# Patient Record
Sex: FEMALE | Race: WHITE | Employment: FULL TIME | URBAN - METROPOLITAN AREA
[De-identification: names, ages, dates, MRNs, and addresses within clinical notes are randomized per-mention and may not be internally consistent; named-entity substitution may affect disease eponyms.]

---

## 2018-07-05 ENCOUNTER — HOSPITAL ENCOUNTER (OUTPATIENT)
Dept: PREADMISSION TESTING | Age: 43
Discharge: HOME | End: 2018-07-09
Payer: COMMERCIAL

## 2018-07-05 VITALS
HEIGHT: 67 IN | DIASTOLIC BLOOD PRESSURE: 73 MMHG | TEMPERATURE: 98.2 F | HEART RATE: 75 BPM | SYSTOLIC BLOOD PRESSURE: 106 MMHG | WEIGHT: 276.4 LBS | RESPIRATION RATE: 16 BRPM | OXYGEN SATURATION: 96 % | BODY MASS INDEX: 43.38 KG/M2

## 2018-07-05 DIAGNOSIS — M16.0 ARTHRITIS OF BOTH HIPS: Chronic | ICD-10-CM

## 2018-07-05 DIAGNOSIS — M72.2 PLANTAR FASCIITIS OF LEFT FOOT: ICD-10-CM

## 2018-07-05 PROBLEM — M06.9 RA (RHEUMATOID ARTHRITIS) (HCC): Chronic | Status: ACTIVE | Noted: 2018-07-05

## 2018-07-05 LAB
HCT VFR BLD CALC: 37.2 % (ref 37–47)
HEMOGLOBIN: 13 G/DL (ref 12–16)
MCH RBC QN AUTO: 32.2 PG (ref 27–31.3)
MCHC RBC AUTO-ENTMCNC: 35 % (ref 33–37)
MCV RBC AUTO: 91.9 FL (ref 82–100)
PDW BLD-RTO: 13.6 % (ref 11.5–14.5)
PLATELET # BLD: 431 K/UL (ref 130–400)
RBC # BLD: 4.05 M/UL (ref 4.2–5.4)
WBC # BLD: 8.7 K/UL (ref 4.8–10.8)

## 2018-07-05 PROCEDURE — 85027 COMPLETE CBC AUTOMATED: CPT

## 2018-07-05 RX ORDER — LANOLIN ALCOHOL/MO/W.PET/CERES
1 CREAM (GRAM) TOPICAL 2 TIMES DAILY
COMMUNITY

## 2018-07-05 RX ORDER — IBUPROFEN 200 MG
200 TABLET ORAL EVERY 6 HOURS PRN
Status: ON HOLD | COMMUNITY
End: 2018-07-06 | Stop reason: HOSPADM

## 2018-07-05 RX ORDER — PANTOPRAZOLE SODIUM 20 MG/1
20 TABLET, DELAYED RELEASE ORAL DAILY
COMMUNITY

## 2018-07-05 ASSESSMENT — ENCOUNTER SYMPTOMS
NAUSEA: 0
SHORTNESS OF BREATH: 0
EYES NEGATIVE: 1
HEARTBURN: 0
DOUBLE VISION: 0
VOMITING: 0
SORE THROAT: 0
WHEEZING: 0
DIARRHEA: 0
BLURRED VISION: 0
ABDOMINAL PAIN: 0
STRIDOR: 0
CONSTIPATION: 0
COUGH: 0
BACK PAIN: 1

## 2018-07-05 NOTE — H&P
Nurse Practitioner History and Physical      CHIEF COMPLAINT:  Left foot pain    HISTORY OF PRESENT ILLNESS:      The patient is a 43 y.o. female with significant past medical history of plantar fasciitis left foot who presents plantar fasciotomy left foot via radiofrequency coblation. States sx of both feet but left worse than right. Has had multiple cortisone injections of both feet, exceeding limit on left foot therefore OR is scheduled. Sx > 4 yrs duration. States unable to walk, weight bear & feet swell. 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 Past Medical History:        Diagnosis Date    Arthritis     knees, hands    Asthma     JESSI on CPAP     PVC (premature ventricular contraction) 2017    cardiac ablation     Past Surgical History:    Past Surgical History:   Procedure Laterality Date    ABLATION OF DYSRHYTHMIC FOCUS  07/06/2017    at [address] Bilateral 2001    COLONOSCOPY      ENDOSCOPY, COLON, DIAGNOSTIC      TUBAL LIGATION  2002         Medications Prior to Admission:    Current Outpatient Prescriptions   Medication Sig Dispense Refill    CPAP Machine MISC by Nasal route       No current facility-administered medications for this encounter. Allergies:  Tetracycline    Social History:   Social History     Social History    Marital status:      Spouse name: N/A    Number of children: N/A    Years of education: N/A     Occupational History    Not on file.      Social History Main Topics    Smoking status: Former Smoker     Packs/day: 0.50     Years: 12.00     Types: Cigarettes     Quit date: 7/5/2014    Smokeless tobacco: Never Used    Alcohol use Yes      Comment: social    Drug use: No    Sexual activity: Not on file     Other Topics Concern    Not on file     Social History Narrative    No narrative on file       Family History:   Family History   Problem Relation Age of Onset    No Known Problems Sister     No Known

## 2018-07-06 ENCOUNTER — ANESTHESIA EVENT (OUTPATIENT)
Dept: OPERATING ROOM | Age: 43
End: 2018-07-06
Payer: COMMERCIAL

## 2018-07-06 ENCOUNTER — ANESTHESIA (OUTPATIENT)
Dept: OPERATING ROOM | Age: 43
End: 2018-07-06
Payer: COMMERCIAL

## 2018-07-06 ENCOUNTER — HOSPITAL ENCOUNTER (OUTPATIENT)
Age: 43
Setting detail: OUTPATIENT SURGERY
Discharge: HOME | End: 2018-07-06
Attending: PODIATRIST | Admitting: PODIATRIST
Payer: COMMERCIAL

## 2018-07-06 VITALS
HEART RATE: 64 BPM | OXYGEN SATURATION: 98 % | DIASTOLIC BLOOD PRESSURE: 89 MMHG | WEIGHT: 276 LBS | SYSTOLIC BLOOD PRESSURE: 143 MMHG | HEIGHT: 67 IN | BODY MASS INDEX: 43.32 KG/M2 | RESPIRATION RATE: 18 BRPM | TEMPERATURE: 97.8 F

## 2018-07-06 VITALS
SYSTOLIC BLOOD PRESSURE: 123 MMHG | DIASTOLIC BLOOD PRESSURE: 59 MMHG | RESPIRATION RATE: 25 BRPM | OXYGEN SATURATION: 100 %

## 2018-07-06 DIAGNOSIS — M72.2 PLANTAR FASCIITIS OF LEFT FOOT: Primary | ICD-10-CM

## 2018-07-06 LAB — HCG(URINE) PREGNANCY TEST: NEGATIVE

## 2018-07-06 PROCEDURE — 2580000003 HC RX 258: Performed by: NURSE PRACTITIONER

## 2018-07-06 PROCEDURE — 7100000010 HC PHASE II RECOVERY - FIRST 15 MIN: Performed by: PODIATRIST

## 2018-07-06 PROCEDURE — 3700000001 HC ADD 15 MINUTES (ANESTHESIA): Performed by: PODIATRIST

## 2018-07-06 PROCEDURE — 7100000011 HC PHASE II RECOVERY - ADDTL 15 MIN: Performed by: PODIATRIST

## 2018-07-06 PROCEDURE — 3700000000 HC ANESTHESIA ATTENDED CARE: Performed by: PODIATRIST

## 2018-07-06 PROCEDURE — 6360000002 HC RX W HCPCS: Performed by: ANESTHESIOLOGY

## 2018-07-06 PROCEDURE — 6370000000 HC RX 637 (ALT 250 FOR IP): Performed by: ANESTHESIOLOGY

## 2018-07-06 PROCEDURE — 84703 CHORIONIC GONADOTROPIN ASSAY: CPT

## 2018-07-06 PROCEDURE — 2500000003 HC RX 250 WO HCPCS: Performed by: PODIATRIST

## 2018-07-06 PROCEDURE — 3600000013 HC SURGERY LEVEL 3 ADDTL 15MIN: Performed by: PODIATRIST

## 2018-07-06 PROCEDURE — 2500000003 HC RX 250 WO HCPCS: Performed by: ANESTHESIOLOGY

## 2018-07-06 PROCEDURE — 3600000003 HC SURGERY LEVEL 3 BASE: Performed by: PODIATRIST

## 2018-07-06 PROCEDURE — 2580000003 HC RX 258: Performed by: PODIATRIST

## 2018-07-06 RX ORDER — DIPHENHYDRAMINE HYDROCHLORIDE 50 MG/ML
12.5 INJECTION INTRAMUSCULAR; INTRAVENOUS
Status: DISCONTINUED | OUTPATIENT
Start: 2018-07-06 | End: 2018-07-06 | Stop reason: HOSPADM

## 2018-07-06 RX ORDER — MAGNESIUM HYDROXIDE 1200 MG/15ML
LIQUID ORAL CONTINUOUS PRN
Status: DISCONTINUED | OUTPATIENT
Start: 2018-07-06 | End: 2018-07-06 | Stop reason: HOSPADM

## 2018-07-06 RX ORDER — SODIUM CHLORIDE 0.9 % (FLUSH) 0.9 %
10 SYRINGE (ML) INJECTION PRN
Status: DISCONTINUED | OUTPATIENT
Start: 2018-07-06 | End: 2018-07-06 | Stop reason: HOSPADM

## 2018-07-06 RX ORDER — FENTANYL CITRATE 50 UG/ML
50 INJECTION, SOLUTION INTRAMUSCULAR; INTRAVENOUS EVERY 10 MIN PRN
Status: DISCONTINUED | OUTPATIENT
Start: 2018-07-06 | End: 2018-07-06 | Stop reason: HOSPADM

## 2018-07-06 RX ORDER — METOCLOPRAMIDE HYDROCHLORIDE 5 MG/ML
10 INJECTION INTRAMUSCULAR; INTRAVENOUS
Status: DISCONTINUED | OUTPATIENT
Start: 2018-07-06 | End: 2018-07-06 | Stop reason: HOSPADM

## 2018-07-06 RX ORDER — PROPOFOL 10 MG/ML
INJECTION, EMULSION INTRAVENOUS PRN
Status: DISCONTINUED | OUTPATIENT
Start: 2018-07-06 | End: 2018-07-06 | Stop reason: SDUPTHER

## 2018-07-06 RX ORDER — OXYCODONE HYDROCHLORIDE AND ACETAMINOPHEN 5; 325 MG/1; MG/1
1 TABLET ORAL EVERY 4 HOURS PRN
Qty: 42 TABLET | Refills: 0 | Status: SHIPPED | OUTPATIENT
Start: 2018-07-06 | End: 2018-07-13

## 2018-07-06 RX ORDER — SODIUM CHLORIDE 0.9 % (FLUSH) 0.9 %
10 SYRINGE (ML) INJECTION EVERY 12 HOURS SCHEDULED
Status: DISCONTINUED | OUTPATIENT
Start: 2018-07-06 | End: 2018-07-06 | Stop reason: HOSPADM

## 2018-07-06 RX ORDER — LIDOCAINE HYDROCHLORIDE 10 MG/ML
INJECTION, SOLUTION EPIDURAL; INFILTRATION; INTRACAUDAL; PERINEURAL PRN
Status: DISCONTINUED | OUTPATIENT
Start: 2018-07-06 | End: 2018-07-06 | Stop reason: SDUPTHER

## 2018-07-06 RX ORDER — LIDOCAINE HYDROCHLORIDE 10 MG/ML
1 INJECTION, SOLUTION EPIDURAL; INFILTRATION; INTRACAUDAL; PERINEURAL
Status: DISCONTINUED | OUTPATIENT
Start: 2018-07-06 | End: 2018-07-06 | Stop reason: HOSPADM

## 2018-07-06 RX ORDER — PROPOFOL 10 MG/ML
INJECTION, EMULSION INTRAVENOUS CONTINUOUS PRN
Status: DISCONTINUED | OUTPATIENT
Start: 2018-07-06 | End: 2018-07-06 | Stop reason: SDUPTHER

## 2018-07-06 RX ORDER — BUPIVACAINE HYDROCHLORIDE AND EPINEPHRINE 5; 5 MG/ML; UG/ML
INJECTION, SOLUTION EPIDURAL; INTRACAUDAL; PERINEURAL PRN
Status: DISCONTINUED | OUTPATIENT
Start: 2018-07-06 | End: 2018-07-06 | Stop reason: HOSPADM

## 2018-07-06 RX ORDER — SODIUM CHLORIDE, SODIUM LACTATE, POTASSIUM CHLORIDE, CALCIUM CHLORIDE 600; 310; 30; 20 MG/100ML; MG/100ML; MG/100ML; MG/100ML
INJECTION, SOLUTION INTRAVENOUS CONTINUOUS
Status: DISCONTINUED | OUTPATIENT
Start: 2018-07-06 | End: 2018-07-06 | Stop reason: HOSPADM

## 2018-07-06 RX ORDER — ONDANSETRON 2 MG/ML
4 INJECTION INTRAMUSCULAR; INTRAVENOUS
Status: COMPLETED | OUTPATIENT
Start: 2018-07-06 | End: 2018-07-06

## 2018-07-06 RX ORDER — HYDROCODONE BITARTRATE AND ACETAMINOPHEN 5; 325 MG/1; MG/1
2 TABLET ORAL PRN
Status: COMPLETED | OUTPATIENT
Start: 2018-07-06 | End: 2018-07-06

## 2018-07-06 RX ORDER — HYDROCODONE BITARTRATE AND ACETAMINOPHEN 5; 325 MG/1; MG/1
1 TABLET ORAL PRN
Status: COMPLETED | OUTPATIENT
Start: 2018-07-06 | End: 2018-07-06

## 2018-07-06 RX ORDER — MEPERIDINE HYDROCHLORIDE 50 MG/ML
12.5 INJECTION INTRAMUSCULAR; INTRAVENOUS; SUBCUTANEOUS EVERY 5 MIN PRN
Status: DISCONTINUED | OUTPATIENT
Start: 2018-07-06 | End: 2018-07-06 | Stop reason: HOSPADM

## 2018-07-06 RX ADMIN — ONDANSETRON 4 MG: 2 INJECTION INTRAMUSCULAR; INTRAVENOUS at 09:42

## 2018-07-06 RX ADMIN — LIDOCAINE HYDROCHLORIDE 50 MG: 10 INJECTION, SOLUTION EPIDURAL; INFILTRATION; INTRACAUDAL; PERINEURAL at 09:00

## 2018-07-06 RX ADMIN — PROPOFOL 100 MCG/KG/MIN: 10 INJECTION, EMULSION INTRAVENOUS at 09:00

## 2018-07-06 RX ADMIN — SODIUM CHLORIDE, POTASSIUM CHLORIDE, SODIUM LACTATE AND CALCIUM CHLORIDE: 600; 310; 30; 20 INJECTION, SOLUTION INTRAVENOUS at 08:03

## 2018-07-06 RX ADMIN — PROPOFOL 100 MG: 10 INJECTION, EMULSION INTRAVENOUS at 09:00

## 2018-07-06 RX ADMIN — HYDROCODONE BITARTRATE AND ACETAMINOPHEN 2 TABLET: 5; 325 TABLET ORAL at 09:58

## 2018-07-06 ASSESSMENT — PULMONARY FUNCTION TESTS
PIF_VALUE: 1
PIF_VALUE: 2
PIF_VALUE: 1
PIF_VALUE: 2
PIF_VALUE: 1
PIF_VALUE: 2
PIF_VALUE: 1
PIF_VALUE: 1
PIF_VALUE: 0
PIF_VALUE: 1

## 2018-07-06 ASSESSMENT — PAIN - FUNCTIONAL ASSESSMENT: PAIN_FUNCTIONAL_ASSESSMENT: 0-10

## 2018-07-06 ASSESSMENT — PAIN SCALES - GENERAL
PAINLEVEL_OUTOF10: 0
PAINLEVEL_OUTOF10: 5
PAINLEVEL_OUTOF10: 0

## 2018-07-06 ASSESSMENT — PAIN DESCRIPTION - DESCRIPTORS: DESCRIPTORS: ACHING

## 2018-07-06 ASSESSMENT — PAIN DESCRIPTION - LOCATION: LOCATION: FOOT

## 2018-07-06 ASSESSMENT — PAIN DESCRIPTION - PAIN TYPE: TYPE: SURGICAL PAIN

## 2018-07-06 ASSESSMENT — PAIN DESCRIPTION - ORIENTATION: ORIENTATION: LEFT

## 2018-07-06 NOTE — ANESTHESIA PRE PROCEDURE
Department of Anesthesiology  Preprocedure Note       Name:  Luc Lucas   Age:  43 y.o.  :  1975                                          MRN:  [id number]         Date:  2018      Surgeon: Donna Harvey):  Tremaine Box DPM    Procedure: Procedure(s):  PLANTAR FASCIOTOMY LEFT VIA RADIOFREQUENCY COBLATION    Medications prior to admission:   Prior to Admission medications    Medication Sig Start Date End Date Taking? Authorizing Provider   CPAP Machine MISC by Nasal route    Historical Provider, MD   ibuprofen (ADVIL;MOTRIN) 200 MG tablet Take 200 mg by mouth every 6 hours as needed for Pain    Historical Provider, MD   adalimumab (HUMIRA) 40 MG/0.8ML injection Inject 40 mg into the skin once    Historical Provider, MD   calcium citrate-vitamin D (CALCIUM + D) 315-200 MG-UNIT per tablet Take 1 tablet by mouth 2 times daily    Historical Provider, MD   pantoprazole (PROTONIX) 20 MG tablet Take 20 mg by mouth daily    Historical Provider, MD       Current medications:    No current facility-administered medications for this encounter. Current Outpatient Prescriptions   Medication Sig Dispense Refill    CPAP Machine MISC by Nasal route      ibuprofen (ADVIL;MOTRIN) 200 MG tablet Take 200 mg by mouth every 6 hours as needed for Pain      adalimumab (HUMIRA) 40 MG/0.8ML injection Inject 40 mg into the skin once      calcium citrate-vitamin D (CALCIUM + D) 315-200 MG-UNIT per tablet Take 1 tablet by mouth 2 times daily      pantoprazole (PROTONIX) 20 MG tablet Take 20 mg by mouth daily         Allergies:     Allergies   Allergen Reactions    Tetracycline Anaphylaxis       Problem List:    Patient Active Problem List   Diagnosis Code    RA (rheumatoid arthritis) ([facility] [address] M06.9    Arthritis of both hips M16.0    Plantar fasciitis of left foot M72.2       Past Medical History:        Diagnosis Date    Arthritis     knees, hands    Asthma     JESSI on CPAP     PVC (premature ventricular contraction)

## 2018-07-06 NOTE — BRIEF OP NOTE
Brief Postoperative Note  ______________________________________________________________    Patient: Era Diamond  YOB: 1975  MRN: [id number]  Date of Procedure: 7/6/2018    Pre-Op Diagnosis: PLANTAR FASCIITIS CHRONIC LEFT    Post-Op Diagnosis: Same       Procedure(s):  PLANTAR FASCIOTOMY LEFT VIA RADIOFREQUENCY COBLATION    Anesthesia: Monitor Anesthesia Care    Surgeon(s):  PEPE Turcios DPM- assistant    Staff:  Scrub Person First: Bryan Reeves     Estimated Blood Loss: * No values recorded between 7/6/2018  8:55 AM and 7/6/2018  9:23 AM * None    Complications: None    Specimens:   * No specimens in log *    Implants:  * No implants in log *      Drains:      Findings: consistent with diagnosis     Nelson Sanchez DPM  Date: 7/6/2018  Time: 9:23 AM

## 2018-07-06 NOTE — PROGRESS NOTES
PRE-OPERATIVE NOTE:   [id number]    S: This is a 43 y.o. female who presents with plantar fasciitis of her left foot that has been refractory to conservative therapy. Patient would like to pursue surgical intervention for this issue. Patient has no new complaints today. No current facility-administered medications for this encounter. No current outpatient prescriptions on file. Facility-Administered Medications Ordered in Other Encounters   Medication Dose Route Frequency Provider Last Rate Last Dose    lactated ringers infusion   Intravenous Continuous [place] Siskin, APRN -  mL/hr at 07/06/18 0803      sodium chloride flush 0.9 % injection 10 mL  10 mL Intravenous 2 times per day [place] Siskin, APRN - CNP        sodium chloride flush 0.9 % injection 10 mL  10 mL Intravenous PRN [place] Siskin, APRN - CNP        lidocaine PF 1 % injection 1 mL  1 mL Intradermal Once PRN [place] Siskin, APRN - CNP           Allergies: Allergies   Allergen Reactions    Tetracycline Anaphylaxis       Social Hx:  Social History   Substance Use Topics    Smoking status: Former Smoker     Packs/day: 0.50     Years: 12.00     Types: Cigarettes     Quit date: 7/5/2014    Smokeless tobacco: Never Used    Alcohol use Yes      Comment: social     Labs:  Lab Results   Component Value Date    WBC 8.7 07/05/2018    HGB 13.0 07/05/2018    HCT 37.2 07/05/2018    MCV 91.9 07/05/2018     (H) 07/05/2018       No results found for: NA, K, CL, CO2, BUN, CREATININE, GLUCOSE, CALCIUM     ECG: n/a    HCG:   HCG(Urine) Pregnancy Test Negative  Detects HCG level >20 MIU/mL Final 07/06/2018  7:46 AM [facility]         PE: NVS intact to left foot. No SOI left foot or LE. No breaks in skin. A: Left foot plantar fasciitis    P: Plan is for left foot topaz procedure in OR today. The correct extremity was identified, confirmed with patient and labeled. Pt has been NPO since midnight.  Pt has been cleared for surgery. The current H&P and respective labs have been reviewed and signed. Again, a long discussion was performed which included all risks, benefits, nature and alternatives to the surgery scheduled. Ms. Brittnee Rodriguez again appears to understand and wishes to proceed. All questions were answered to patient's and family's satisfaction with no guarantees given. Consent was signed, is up to date and is in chart.

## 2018-07-06 NOTE — ANESTHESIA POSTPROCEDURE EVALUATION
Department of Anesthesiology  Postprocedure Note    Patient: Gilson Anthony  MRN: [id number]  YOB: 1975  Date of evaluation: 7/6/2018  Time:  9:32 AM     Procedure Summary     Date:  07/06/18 Room / Location:  [address]    Anesthesia Start:  0856 Anesthesia Stop:  0930    Procedure:  PLANTAR FASCIOTOMY LEFT VIA RADIOFREQUENCY COBLATION (Left ) Diagnosis:  (PLANTAR FASCIITIS CHRONIC LEFT)    Surgeon:  Fatuma Malone DPM Responsible Provider:  Thaddeus White MD    Anesthesia Type:  MAC ASA Status:  3          Anesthesia Type: MAC    Ernie Phase I: Ernie Score: 10    Ernie Phase II:      Last vitals: Reviewed and per EMR flowsheets.        Anesthesia Post Evaluation    Patient location during evaluation: PACU  Patient participation: complete - patient participated  Level of consciousness: awake and alert  Pain score: 0  Airway patency: patent  Nausea & Vomiting: no nausea and no vomiting  Complications: no  Cardiovascular status: blood pressure returned to baseline and hemodynamically stable  Respiratory status: acceptable  Hydration status: euvolemic

## 2023-10-20 PROBLEM — R35.0 URINARY FREQUENCY: Status: ACTIVE | Noted: 2023-10-20

## 2023-10-20 PROBLEM — E66.812 CLASS 2 SEVERE OBESITY WITH BODY MASS INDEX (BMI) OF 35 TO 39.9 WITH SERIOUS COMORBIDITY: Status: ACTIVE | Noted: 2023-10-20

## 2023-10-20 PROBLEM — E78.5 HYPERLIPIDEMIA: Status: ACTIVE | Noted: 2023-10-20

## 2023-10-20 PROBLEM — M77.11 LATERAL EPICONDYLITIS OF RIGHT ELBOW: Status: ACTIVE | Noted: 2023-10-20

## 2023-10-20 PROBLEM — R06.83 SNORING: Status: ACTIVE | Noted: 2023-10-20

## 2023-10-20 PROBLEM — R09.89 CHOKING EPISODE: Status: ACTIVE | Noted: 2023-10-20

## 2023-10-20 PROBLEM — N93.9 ABNORMAL UTERINE BLEEDING (AUB): Status: ACTIVE | Noted: 2023-10-20

## 2023-10-20 PROBLEM — M06.9 RHEUMATOID ARTHRITIS (MULTI): Status: ACTIVE | Noted: 2023-10-20

## 2023-10-20 PROBLEM — I10 BENIGN ESSENTIAL HYPERTENSION: Status: ACTIVE | Noted: 2023-10-20

## 2023-10-20 PROBLEM — R00.2 PALPITATIONS: Status: ACTIVE | Noted: 2023-10-20

## 2023-10-20 PROBLEM — N95.1 PERIMENOPAUSE: Status: ACTIVE | Noted: 2023-10-20

## 2023-10-20 PROBLEM — Z98.890 H/O CARDIAC RADIOFREQUENCY ABLATION: Status: ACTIVE | Noted: 2023-10-20

## 2023-10-20 PROBLEM — E55.9 VITAMIN D INSUFFICIENCY: Status: ACTIVE | Noted: 2023-10-20

## 2023-10-20 PROBLEM — K21.9 GERD (GASTROESOPHAGEAL REFLUX DISEASE): Status: ACTIVE | Noted: 2023-10-20

## 2023-10-20 PROBLEM — E53.8 B12 DEFICIENCY: Status: ACTIVE | Noted: 2023-10-20

## 2023-10-20 PROBLEM — R07.81 CHEST PAIN, PLEURITIC: Status: ACTIVE | Noted: 2023-10-20

## 2023-10-20 PROBLEM — L65.9 HAIR LOSS: Status: ACTIVE | Noted: 2023-10-20

## 2023-10-20 PROBLEM — M62.838 CERVICAL PARASPINAL MUSCLE SPASM: Status: ACTIVE | Noted: 2023-10-20

## 2023-10-20 PROBLEM — K59.09 CONSTIPATION, CHRONIC: Status: ACTIVE | Noted: 2023-10-20

## 2023-10-20 PROBLEM — K44.9 HIATAL HERNIA: Status: ACTIVE | Noted: 2023-10-20

## 2023-10-20 PROBLEM — G47.33 OBSTRUCTIVE SLEEP APNEA: Status: ACTIVE | Noted: 2023-10-20

## 2023-10-20 PROBLEM — D64.9 ANEMIA: Status: ACTIVE | Noted: 2023-10-20

## 2023-10-20 PROBLEM — E66.01 CLASS 2 SEVERE OBESITY WITH BODY MASS INDEX (BMI) OF 35 TO 39.9 WITH SERIOUS COMORBIDITY (MULTI): Status: ACTIVE | Noted: 2023-10-20

## 2023-10-20 PROBLEM — K63.5 COLON POLYPS: Status: ACTIVE | Noted: 2023-10-20

## 2023-10-20 PROBLEM — G47.8 NON-RESTORATIVE SLEEP: Status: ACTIVE | Noted: 2023-10-20

## 2023-10-20 PROBLEM — E11.65 UNCONTROLLED TYPE 2 DIABETES MELLITUS WITH HYPERGLYCEMIA (MULTI): Status: ACTIVE | Noted: 2023-10-20

## 2023-10-20 RX ORDER — OMEGA-3/DHA/EPA/FISH OIL 300-1000MG
CAPSULE,DELAYED RELEASE (ENTERIC COATED) ORAL
COMMUNITY
Start: 2014-08-13

## 2023-10-20 RX ORDER — DAPAGLIFLOZIN 5 MG/1
1 TABLET, FILM COATED ORAL EVERY MORNING
COMMUNITY
Start: 2022-02-14 | End: 2024-01-08 | Stop reason: WASHOUT

## 2023-10-20 RX ORDER — GLIPIZIDE 10 MG/1
1 TABLET ORAL 2 TIMES DAILY
COMMUNITY
Start: 2021-12-13 | End: 2024-01-08 | Stop reason: WASHOUT

## 2023-10-20 RX ORDER — CYANOCOBALAMIN 1000 UG/ML
1000 INJECTION, SOLUTION INTRAMUSCULAR; SUBCUTANEOUS
COMMUNITY
Start: 2021-05-04 | End: 2024-01-08 | Stop reason: WASHOUT

## 2023-10-20 RX ORDER — DULAGLUTIDE 1.5 MG/.5ML
1.5 INJECTION, SOLUTION SUBCUTANEOUS
COMMUNITY
Start: 2022-01-31 | End: 2024-01-08 | Stop reason: WASHOUT

## 2023-10-20 RX ORDER — ASPIRIN 325 MG
1 TABLET, DELAYED RELEASE (ENTERIC COATED) ORAL
COMMUNITY
Start: 2021-11-09 | End: 2024-01-08 | Stop reason: WASHOUT

## 2023-10-20 RX ORDER — LOSARTAN POTASSIUM 25 MG/1
0.5 TABLET ORAL DAILY
COMMUNITY
Start: 2021-03-15 | End: 2024-02-14 | Stop reason: SDUPTHER

## 2023-10-20 RX ORDER — IBUPROFEN 800 MG/1
1 TABLET ORAL EVERY 8 HOURS
COMMUNITY
Start: 2015-12-09 | End: 2023-11-29 | Stop reason: SDUPTHER

## 2023-10-20 RX ORDER — PANTOPRAZOLE SODIUM 40 MG/1
1 TABLET, DELAYED RELEASE ORAL DAILY
COMMUNITY
Start: 2021-06-10 | End: 2023-12-27 | Stop reason: ALTCHOICE

## 2023-10-23 ENCOUNTER — LAB (OUTPATIENT)
Dept: LAB | Facility: LAB | Age: 48
End: 2023-10-23
Payer: COMMERCIAL

## 2023-10-23 ENCOUNTER — OFFICE VISIT (OUTPATIENT)
Dept: GASTROENTEROLOGY | Facility: CLINIC | Age: 48
End: 2023-10-23
Payer: COMMERCIAL

## 2023-10-23 ENCOUNTER — APPOINTMENT (OUTPATIENT)
Dept: GASTROENTEROLOGY | Facility: CLINIC | Age: 48
End: 2023-10-23
Payer: COMMERCIAL

## 2023-10-23 VITALS
SYSTOLIC BLOOD PRESSURE: 114 MMHG | HEIGHT: 67 IN | BODY MASS INDEX: 34.31 KG/M2 | DIASTOLIC BLOOD PRESSURE: 79 MMHG | WEIGHT: 218.6 LBS | HEART RATE: 74 BPM

## 2023-10-23 DIAGNOSIS — R13.19 ESOPHAGEAL DYSPHAGIA: ICD-10-CM

## 2023-10-23 DIAGNOSIS — K92.1 MELENA: ICD-10-CM

## 2023-10-23 DIAGNOSIS — R19.4 CHANGE IN BOWEL HABITS: Primary | ICD-10-CM

## 2023-10-23 DIAGNOSIS — R19.4 CHANGE IN BOWEL HABITS: ICD-10-CM

## 2023-10-23 PROBLEM — K62.89 RECTAL PAIN: Status: ACTIVE | Noted: 2023-10-23

## 2023-10-23 PROBLEM — R13.10 DYSPHAGIA: Status: ACTIVE | Noted: 2023-10-23

## 2023-10-23 LAB
ANION GAP SERPL CALC-SCNC: 12 MMOL/L (ref 10–20)
BUN SERPL-MCNC: 12 MG/DL (ref 6–23)
CALCIUM SERPL-MCNC: 9.7 MG/DL (ref 8.6–10.3)
CHLORIDE SERPL-SCNC: 108 MMOL/L (ref 98–107)
CO2 SERPL-SCNC: 27 MMOL/L (ref 21–32)
CREAT SERPL-MCNC: 0.7 MG/DL (ref 0.5–1.05)
ERYTHROCYTE [DISTWIDTH] IN BLOOD BY AUTOMATED COUNT: 14.2 % (ref 11.5–14.5)
FERRITIN SERPL-MCNC: 87 NG/ML (ref 8–150)
GFR SERPL CREATININE-BSD FRML MDRD: >90 ML/MIN/1.73M*2
GLUCOSE SERPL-MCNC: 104 MG/DL (ref 74–99)
HCT VFR BLD AUTO: 39.6 % (ref 36–46)
HGB BLD-MCNC: 13.2 G/DL (ref 12–16)
IRON SATN MFR SERPL: 29 % (ref 25–45)
IRON SERPL-MCNC: 97 UG/DL (ref 35–150)
MCH RBC QN AUTO: 31.4 PG (ref 26–34)
MCHC RBC AUTO-ENTMCNC: 33.3 G/DL (ref 32–36)
MCV RBC AUTO: 94 FL (ref 80–100)
NRBC BLD-RTO: 0 /100 WBCS (ref 0–0)
PLATELET # BLD AUTO: 407 X10*3/UL (ref 150–450)
PMV BLD AUTO: 9.5 FL (ref 7.5–11.5)
POTASSIUM SERPL-SCNC: 4.4 MMOL/L (ref 3.5–5.3)
RBC # BLD AUTO: 4.2 X10*6/UL (ref 4–5.2)
SODIUM SERPL-SCNC: 143 MMOL/L (ref 136–145)
TIBC SERPL-MCNC: 336 UG/DL (ref 240–445)
UIBC SERPL-MCNC: 239 UG/DL (ref 110–370)
WBC # BLD AUTO: 7.8 X10*3/UL (ref 4.4–11.3)

## 2023-10-23 PROCEDURE — 3078F DIAST BP <80 MM HG: CPT | Performed by: PHYSICIAN ASSISTANT

## 2023-10-23 PROCEDURE — 4010F ACE/ARB THERAPY RXD/TAKEN: CPT | Performed by: PHYSICIAN ASSISTANT

## 2023-10-23 PROCEDURE — 82728 ASSAY OF FERRITIN: CPT

## 2023-10-23 PROCEDURE — 3074F SYST BP LT 130 MM HG: CPT | Performed by: PHYSICIAN ASSISTANT

## 2023-10-23 PROCEDURE — 80048 BASIC METABOLIC PNL TOTAL CA: CPT

## 2023-10-23 PROCEDURE — 85027 COMPLETE CBC AUTOMATED: CPT

## 2023-10-23 PROCEDURE — 83550 IRON BINDING TEST: CPT

## 2023-10-23 PROCEDURE — 99205 OFFICE O/P NEW HI 60 MIN: CPT | Performed by: PHYSICIAN ASSISTANT

## 2023-10-23 PROCEDURE — 36415 COLL VENOUS BLD VENIPUNCTURE: CPT

## 2023-10-23 PROCEDURE — 83540 ASSAY OF IRON: CPT

## 2023-10-23 RX ORDER — BUPROPION HYDROCHLORIDE 100 MG/1
300 TABLET ORAL DAILY
COMMUNITY

## 2023-10-23 RX ORDER — TIRZEPATIDE 7.5 MG/.5ML
10 INJECTION, SOLUTION SUBCUTANEOUS
COMMUNITY
End: 2024-01-08 | Stop reason: WASHOUT

## 2023-10-23 RX ORDER — ROSUVASTATIN CALCIUM 20 MG/1
20 TABLET, COATED ORAL DAILY
COMMUNITY
End: 2024-05-29 | Stop reason: SDUPTHER

## 2023-10-23 RX ORDER — MODAFINIL 200 MG/1
200 TABLET ORAL DAILY
COMMUNITY

## 2023-10-23 RX ORDER — PSYLLIUM HUSK 0.4 G
5 CAPSULE ORAL 4 TIMES DAILY
COMMUNITY

## 2023-10-23 ASSESSMENT — ENCOUNTER SYMPTOMS
CHILLS: 0
WHEEZING: 0
HEMATURIA: 0
EYE PAIN: 0
NUMBNESS: 0
WEAKNESS: 0
APPETITE CHANGE: 0
TROUBLE SWALLOWING: 0
ARTHRALGIAS: 0
FEVER: 0
JOINT SWELLING: 0
COUGH: 1
DIZZINESS: 0
UNEXPECTED WEIGHT CHANGE: 0
SORE THROAT: 0
SHORTNESS OF BREATH: 0
DYSURIA: 0
MYALGIAS: 0
HEADACHES: 0

## 2023-10-23 NOTE — ASSESSMENT & PLAN NOTE
Last colonoscopy was March 2020 with 4 polyps removed, all less than 10 mm.  2 were hyperplastic and 2 were tubular adenomas. Repeat screening 2025

## 2023-10-23 NOTE — PATIENT INSTRUCTIONS
Thank you for seeing us in clinic today!     In summary, please do the following:  We will schedule you for your EGD   and Colonoscopy at [facility] .  You will need a  the day of the exam   Recommend taking metamucil daily and miralax daily   Continue taking pantoprazole 40 mg daily  Lifestyle Changes to Improve Gastroesophageal Reflux Symptoms     Do not sleep flat:  Your head and chest need to be least 30 degrees above your belly  This allows gravity to help keep the stomach's contents in the stomach.   Use a bed wedge pillow or an anti-reflux pillow.    Do not use piles of pillows - may increase pressure on the abdomen              - Sleep on your left side - Reduces reflux because of the way your stomach curves.               - a body pillow can help keep you on your left  See: www.Medcline.com for an integrated Anti-reflux sleep system           For assist with insurance coverage: http://Maim[employer]ibao/insurance-coverage-information/     Make sure that you are taking your Proton Pump Inhibitor medication (such as omeprazole [Prilosec] and like medications) correctly. Take them 30 minutes BEFORE Breakfast and/or Dinner, as instructed - they are more effective when take before the meal!  Eat moderate portions of food and smaller meals.   Avoid lying down within 3 hours of eating  Avoid bedtime snacks.   Maintain a healthy weight   extra pounds increase intra-abdominal pressure   Limit consumption of foods that relax the lower esophageal sphincter - if they are known to bother you.    Examples:  fatty foods, chocolate, peppermint, coffee, tea, marcos, and alcohol   Avoid foods which contribute additional acid that can irritate the esophagus  Examples:  tomatoes and citrus fruits or juices  If you smoke, you need to stop.  Smoking relaxes the lower esophageal sphincter.  Ask about smoking cessation tips  Wear loose belts and clothing.   Try taking the antacid GavisconTR (aluminum hydroxide/ magnesium carbonate)  before bedtime       We will see you again in 2 week after endoscopy    If you have any questions or concerns, please call the office at [phone]

## 2023-10-23 NOTE — ASSESSMENT & PLAN NOTE
Worsening with melena and rectal pain  Takes metamucil daily (tablets) will add miralax at this time. Low concern for colorectal cancer given up to date with screening. May need to consider pelvic floor therapy

## 2023-10-23 NOTE — ASSESSMENT & PLAN NOTE
Occurs with baring down with urination and defecation. No dyspareunia. Up to date on pap smears. Discussed with patient she will need to address this with her PCP and gynecologist as well

## 2023-10-23 NOTE — PROGRESS NOTES
Gastroenterology Office Visit     History of Present Illness:   Lashell Alcazar is a 48 y.o. female who presents to GI clinic for colon cancer screening.    She has been having pain with urinating or passing stools. It feels like something is being stuck up her rectum. It is sharp in nature. It started with both but can happen with either. It is happening more frequently. It can occur 3-4 days out of the week and can occur with one episode or multiple throughout the day.    She has never been regular and alternates between constipation and diarrhea. She believes it is getting worse. She used to be more regular and could easily predict bowel movements. This is no longer having the predictability. She is having longer bouts of constipation and typically passing hard stools. She is taking metamucil once daily. She will occasionally pass hard stools that are cause trauma to some hemorrhoidal bleeding. Having passing hard stools she may pass some black soft stools that stain the toilet bowel.      She is taking pantoprazole once daily. She is getting regurgitation 4-5 days a week. It occurs with spit, water, or solids. She typically swallows the regurgitation. She endorses occasionally break through symptoms when not following a diet. She denies odynophagia, nausea, or vomiting.      Last colonoscopy: 3/9/2020, recommend repeat in 5 years  Last endosopy: 3/9/2020    History of tubal ligation     Review of Systems  Review of Systems   Constitutional:  Negative for appetite change, chills, fever and unexpected weight change.   HENT:  Negative for mouth sores, sore throat and trouble swallowing.    Eyes:  Negative for pain and visual disturbance.   Respiratory:  Positive for cough (allergies). Negative for shortness of breath ([place]) and wheezing.    Cardiovascular:  Negative for chest pain.   Genitourinary:  Negative for decreased urine volume, dysuria and hematuria.   Musculoskeletal:  Negative for arthralgias, joint  swelling and myalgias.   Skin:  Negative for pallor and rash.   Neurological:  Negative for dizziness, weakness, numbness and headaches.       Past Medical History   has a past medical history of Encounter for therapeutic drug level monitoring (10/12/2016), Other conditions influencing health status (09/09/2015), Pain in left lower leg (09/09/2015), Pain in right lower leg (09/09/2015), and Personal history of other diseases of the digestive system (07/08/2015).     Past Surgical History  Past Surgical History:   Procedure Laterality Date    CARPAL TUNNEL RELEASE  08/13/2014    Neuroplasty Decompression Median Nerve At Carpal Tunnel    OTHER SURGICAL HISTORY  02/19/2020    Colonoscopy    OTHER SURGICAL HISTORY  02/19/2020    Esophagogastroduodenoscopy    OTHER SURGICAL HISTORY  02/03/2020    Catheter ablation    OTHER SURGICAL HISTORY  10/12/2019    Foot surgery    TUBAL LIGATION  08/13/2014    Tubal Ligation       Social History   reports that she has quit smoking. Her smoking use included cigarettes. She does not have any smokeless tobacco history on file. She reports current alcohol use of about 8.0 standard drinks of alcohol per week. She reports that she does not use drugs.     Family History  family history includes Bicuspid aortic valve in her mother; Diabetes in her father and another family member; Heart attack in her maternal grandfather and paternal grandfather; Heart failure in her father; Hyperlipidemia in her father; Hypertension in her father; Rheum arthritis in her father, maternal grandmother, and paternal grandmother.   No family history of stomach or colon cancer    Allergies  Allergies   Allergen Reactions    Bee Venom Protein (Honey Bee) Anaphylaxis    Tetracycline Anaphylaxis and Hives    Fentanyl Headache       Medications  Current Outpatient Medications   Medication Instructions    buPROPion (WELLBUTRIN) 300 mg, oral, Daily    cholecalciferol (Vitamin D-3) 50,000 unit capsule 1 capsule, oral,  Weekly    cyanocobalamin (VITAMIN B-12) 1,000 mcg, intramuscular, Every 30 days    dapagliflozin propanediol (Farxiga) 5 mg 1 tablet, oral, Every morning    glipiZIDE (Glucotrol) 10 mg tablet 1 tablet, oral, 2 times daily    ibuprofen 800 mg tablet 1 tablet, oral, Every 8 hours, Take with food    losartan (Cozaar) 25 mg tablet 0.5 tablets, oral, Daily    modafinil (PROVIGIL) 200 mg, oral, Daily    Mounjaro 7.5 mg, subcutaneous, Every 7 days    omega 3-dha-epa-fish oil (Fish OiL) 300-1,000 mg capsule,delayed release(DR/EC) oral    pantoprazole (ProtoNix) 40 mg EC tablet 1 tablet, oral, Daily    psyllium (Metamucil) 0.4 gram capsule 5 capsules, oral, 4 times daily    rosuvastatin (CRESTOR) 20 mg, oral, Daily    Trulicity 1.5 mg, subcutaneous, Weekly, As directed        Objective   Visit Vitals  /79   Pulse 74        Physical Exam  Constitutional:       General: She is not in acute distress.     Appearance: Normal appearance.   HENT:      Mouth/Throat:      Mouth: Mucous membranes are moist.      Pharynx: Oropharynx is clear.   Eyes:      General: No scleral icterus (cbc).     Extraocular Movements: Extraocular movements intact.      Conjunctiva/sclera: Conjunctivae normal.      Pupils: Pupils are equal, round, and reactive to light.   Cardiovascular:      Rate and Rhythm: Normal rate and regular rhythm.      Heart sounds: No murmur heard.  Pulmonary:      Effort: Pulmonary effort is normal.      Breath sounds: No wheezing or rhonchi.   Abdominal:      General: Bowel sounds are normal. There is no distension.      Palpations: Abdomen is soft. There is no mass.      Tenderness: There is no abdominal tenderness.      Hernia: No hernia is present.   Musculoskeletal:         General: No swelling or deformity.   Skin:     General: Skin is warm.      Coloration: Skin is not jaundiced.   Neurological:      General: No focal deficit present.      Mental Status: She is alert and oriented to person, place, and time.    Psychiatric:         Mood and Affect: Mood normal.         LABS  Pertinent labs were reviewed with the patient  Component  Ref Range & Units 3 mo ago Comments   Glucose  65 - 99 mg/dL 129 High  .             Fasting reference interval  .  For someone without known diabetes, a glucose  value >125 mg/dL indicates that they may have  diabetes and this should be confirmed with a  follow-up test.  .   BUN  7 - 25 mg/dL 15    Creatinine  0.50 - 0.99 mg/dL 0.69    EGFR  > OR = 60 mL/min/1.73m2 108 The eGFR is based on the CKD-EPI 2021 equation. To calculate  the new eGFR from a previous Creatinine or Cystatin C  result, go to https://www.kidney.org/professionals/  kdoqi/gfr%5Fcalculator   BUN/Creatinine Ratio  6 - 22 (calc) NOT APPLICABLE    Sodium  135 - 146 mmol/L 142    Potassium  3.5 - 5.3 mmol/L 3.7    Chloride  98 - 110 mmol/L 107    CO2  20 - 32 mmol/L 26    Calcium  8.6 - 10.2 mg/dL 9.5    Protein, Total  6.1 - 8.1 g/dL 6.9    Albumin  3.6 - 5.1 g/dL 4.5    Globulin  1.9 - 3.7 g/dL (calc) 2.4    Albumin/Globulin Ratio  1.0 - 2.5 (calc) 1.9    Bilirubin, Total  0.2 - 1.2 mg/dL 0.5    Alkaline Phosphatase  31 - 125 U/L 45    Aspartate Transaminase  10 - 35 U/L 13    Alanine Transaminase  6 - 29 U/L 19      Component  Ref Range & Units 3 mo ago Comments   TSH  mIU/L 4.07           Reference Range  .       Radiology  Pertinent radiology was reviewed with the patient  US pelvis transvaginal 8/13/2019  IMPRESSION:  1. Limited study. Bilateral ovarian cysts.    Endoscopy  Colonoscopy by Dr Joseph 3/9/2020  Indication surveillance, personal history of adenomatous polyps on last colonoscopy 5 years ago  Impression  The examined portion of the ileum was normal  Diverticulosis in the sigmoid colon  External and internal hemorrhoids  One 5 mm polyp in the transverse colon, removed with a cold snare.  Resected and retrieved  1 4 mm polyp in the descending colon, removed with a cold snare.  Dissected and retrieved  2  diminutive polyps in the rectum, removed with a cold biopsy forcep.  Resected and retrieved    FINAL DIAGNOSIS   A.  TRANSVERSE COLON POLYP, POLYPECTOMY:   --TUBULAR ADENOMA.       B.  DESCENDING COLON POLYP, POLYPECTOMY:   --TUBULAR ADENOMA.       C.  RECTAL POLYP x2, POLYPECTOMY:   --HYPERPLASTIC POLYP x2.     EGD by Dr Joseph 3/9/2020  Indication heartburn  Impression  Normal esophagus  Z-line regular, 38 cm from the incisors  Small hiatal hernia  Normal examined duodenum  No specimens collected    Assessment/Plan   Lashell Alcazar is a 48 y.o. female who presents to GI clinic for colon cancer screening.    Colon polyps  Last colonoscopy was March 2020 with 4 polyps removed, all less than 10 mm.  2 were hyperplastic and 2 were tubular adenomas. Repeat screening 2025    Constipation, chronic  Worsening with melena and rectal pain  Takes metamucil daily (tablets) will add miralax at this time. Low concern for colorectal cancer given up to date with screening. May need to consider pelvic floor therapy    Melena  Intermittent, occurs after having hard stools. Described as sticky.     Rectal pain  Occurs with baring down with urination and defecation. No dyspareunia. Up to date on pap smears. Discussed with patient she will need to address this with her PCP and gynecologist as well    Dysphagia  Occurs with solids, liquids, and salvia. Most likely motility dysfunction. Unremarkable EGD 2020      Lashell was seen today for colon cancer screening, constipation and gerd.  Diagnoses and all orders for this visit:  Change in bowel habits  -     Colonoscopy Diagnostic; Future  -     EGD; Future  -     CT abdomen pelvis w IV contrast; Future  -     Basic metabolic panel; Future  Melena  -     CBC; Future  -     Iron and TIBC; Future  -     Ferritin; Future  -     EGD; Future  -     Basic metabolic panel; Future  Esophageal dysphagia  -     EGD; Future  -     Basic metabolic panel; Future     Gloria Gonzalez PA-C

## 2023-11-06 ENCOUNTER — HOSPITAL ENCOUNTER (OUTPATIENT)
Dept: RADIOLOGY | Facility: HOSPITAL | Age: 48
Discharge: HOME | End: 2023-11-06
Payer: COMMERCIAL

## 2023-11-06 DIAGNOSIS — R19.4 CHANGE IN BOWEL HABITS: ICD-10-CM

## 2023-11-06 PROCEDURE — 74177 CT ABD & PELVIS W/CONTRAST: CPT | Performed by: STUDENT IN AN ORGANIZED HEALTH CARE EDUCATION/TRAINING PROGRAM

## 2023-11-06 PROCEDURE — 2550000001 HC RX 255 CONTRASTS: Performed by: PHYSICIAN ASSISTANT

## 2023-11-06 PROCEDURE — 74177 CT ABD & PELVIS W/CONTRAST: CPT

## 2023-11-06 RX ADMIN — IOHEXOL 75 ML: 350 INJECTION, SOLUTION INTRAVENOUS at 18:30

## 2023-11-07 ENCOUNTER — TELEPHONE (OUTPATIENT)
Dept: GASTROENTEROLOGY | Facility: CLINIC | Age: 48
End: 2023-11-07
Payer: COMMERCIAL

## 2023-11-07 NOTE — TELEPHONE ENCOUNTER
Gastroenterology  Chart Update    Called patient to discuss CT abdomen pelvis.  HIPAA approved voicemail left.  Advised patient to call back to discuss results.    Results showed a rectocele has been present since 2017.  This can cause some of her pelvic pain as well as her constipation.  This is usually treated originally with MiraLAX, fiber, and assistance with bowel movements.  It can progress to requiring a pessary and pelvic floor therapy and/or surgery.  Patient should follow-up with gynecology to discuss pessaries    She is currently scheduled for colonoscopy December 1    Gloria Gonzalez PA-C

## 2023-11-10 ENCOUNTER — APPOINTMENT (OUTPATIENT)
Dept: GASTROENTEROLOGY | Facility: HOSPITAL | Age: 48
End: 2023-11-10
Payer: COMMERCIAL

## 2023-11-16 ENCOUNTER — OFFICE VISIT (OUTPATIENT)
Dept: ENDOCRINOLOGY | Age: 48
End: 2023-11-16

## 2023-11-16 VITALS
HEIGHT: 67 IN | OXYGEN SATURATION: 98 % | DIASTOLIC BLOOD PRESSURE: 77 MMHG | BODY MASS INDEX: 34.53 KG/M2 | WEIGHT: 220 LBS | SYSTOLIC BLOOD PRESSURE: 121 MMHG | HEART RATE: 71 BPM

## 2023-11-16 DIAGNOSIS — E11.69 TYPE 2 DIABETES MELLITUS WITH OTHER SPECIFIED COMPLICATION, WITHOUT LONG-TERM CURRENT USE OF INSULIN (HCC): Primary | ICD-10-CM

## 2023-11-16 LAB
CHP ED QC CHECK: NORMAL
GLUCOSE BLD-MCNC: 91 MG/DL
HBA1C MFR BLD: 5.1 %
HEMOGLOBIN A1C/HEMOGLOBIN TOTAL IN BLOOD EXTERNAL: 5.1 %

## 2023-11-16 RX ORDER — LEVONORGESTREL 52 MG/1
52 INTRAUTERINE DEVICE INTRAUTERINE ONCE
COMMUNITY

## 2023-11-16 RX ORDER — TIRZEPATIDE 10 MG/.5ML
10 INJECTION, SOLUTION SUBCUTANEOUS WEEKLY
Qty: 12 ADJUSTABLE DOSE PRE-FILLED PEN SYRINGE | Refills: 3 | Status: SHIPPED | OUTPATIENT
Start: 2023-11-16

## 2023-11-16 RX ORDER — IBUPROFEN 800 MG/1
1 TABLET ORAL EVERY 8 HOURS PRN
COMMUNITY
Start: 2023-07-17

## 2023-11-16 RX ORDER — TIRZEPATIDE 10 MG/.5ML
10 INJECTION, SOLUTION SUBCUTANEOUS WEEKLY
Qty: 4 ADJUSTABLE DOSE PRE-FILLED PEN SYRINGE | Refills: 3 | Status: SHIPPED | OUTPATIENT
Start: 2023-11-16 | End: 2023-11-16 | Stop reason: SDUPTHER

## 2023-11-16 RX ORDER — ROSUVASTATIN CALCIUM 20 MG/1
20 TABLET, COATED ORAL
COMMUNITY
Start: 2023-10-07

## 2023-11-16 RX ORDER — BUPROPION HYDROCHLORIDE 300 MG/1
300 TABLET ORAL DAILY
COMMUNITY
Start: 2023-09-10

## 2023-11-16 RX ORDER — MODAFINIL 200 MG/1
200 TABLET ORAL DAILY
COMMUNITY
Start: 2023-07-26

## 2023-11-16 RX ORDER — LOSARTAN POTASSIUM 25 MG/1
25 TABLET ORAL DAILY
COMMUNITY
Start: 2023-10-31

## 2023-11-16 RX ORDER — TIRZEPATIDE 7.5 MG/.5ML
INJECTION, SOLUTION SUBCUTANEOUS
COMMUNITY
Start: 2023-09-19

## 2023-11-16 ASSESSMENT — ENCOUNTER SYMPTOMS
VISUAL CHANGE: 0
EYES NEGATIVE: 1

## 2023-11-16 NOTE — PROGRESS NOTES
11/16/2023    Assessment:       Diagnosis Orders   1. Type 2 diabetes mellitus with other specified complication, without long-term current use of insulin (HCC)  POCT Glucose    POCT glycosylated hemoglobin (Hb A1C)            PLAN:     Orders Placed This Encounter   Medications    Tirzepatide (MOUNJARO) 10 MG/0.5ML SOPN SC injection     Sig: Inject 0.5 mLs into the skin once a week     Dispense:  4 Adjustable Dose Pre-filled Pen Syringe     Refill:  3     Orders Placed This Encounter   Procedures    Hemoglobin A1C     Standing Status:   Future     Standing Expiration Date:   30/21/9499    Basic Metabolic Panel     Standing Status:   Future     Standing Expiration Date:   11/16/2024    POCT Glucose    POCT glycosylated hemoglobin (Hb A1C)     Increase dose of Mounjaro more than 50% of 30  Patient education and counseling    Orders Placed This Encounter   Procedures    POCT Glucose    POCT glycosylated hemoglobin (Hb A1C)     No orders of the defined types were placed in this encounter. No follow-ups on file. Subjective:     Chief Complaint   Patient presents with    New Patient    Diabetes     Vitals:    11/16/23 1422   BP: 121/77   Pulse: 71   SpO2: 98%   Weight: 99.8 kg (220 lb)   Height: 1.702 m (5' 7.01\")     Wt Readings from Last 3 Encounters:   11/16/23 99.8 kg (220 lb)   07/05/18 125.4 kg (276 lb 6.4 oz)   07/06/18 125.2 kg (276 lb)     BP Readings from Last 3 Encounters:   11/16/23 121/77   07/05/18 106/73   07/06/18 (!) 143/89     Patient referred here for type 2 diabetes currently on Mounjaro 7.5 mg once a week. Use a higher dose has not been able to lose weight BMI 34 hemoglobin A1c was less than 6  Hemoglobin A1C       Date                     Value               Ref Range           Status                11/16/2023               5.1                 %                   Final            ----------      Diabetes  She presents for her initial diabetic visit. She has type 2 diabetes mellitus.  Associated

## 2023-11-27 ENCOUNTER — TELEPHONE (OUTPATIENT)
Dept: ENDOCRINOLOGY | Age: 48
End: 2023-11-27

## 2023-11-27 NOTE — TELEPHONE ENCOUNTER
PA done at [facility]. Message received,   Key: [id number] - PA Case ID: [id number]    Your PA request has been approved. Additional information will be provided in the approval communication.

## 2023-11-29 DIAGNOSIS — M06.9 RHEUMATOID ARTHRITIS, INVOLVING UNSPECIFIED SITE, UNSPECIFIED WHETHER RHEUMATOID FACTOR PRESENT (MULTI): ICD-10-CM

## 2023-11-29 RX ORDER — IBUPROFEN 800 MG/1
800 TABLET ORAL EVERY 8 HOURS
Qty: 90 TABLET | Refills: 0 | Status: SHIPPED | OUTPATIENT
Start: 2023-11-29 | End: 2024-04-10 | Stop reason: SDUPTHER

## 2023-11-29 NOTE — TELEPHONE ENCOUNTER
Patient requests prescription below    Last Office Visit: 11/06/2023    Requested Prescriptions     Pending Prescriptions Disp Refills    ibuprofen 800 mg tablet 90 tablet 0     Sig: Take 1 tablet (800 mg) by mouth every 8 hours. Take with food

## 2023-12-01 ENCOUNTER — ANESTHESIA (OUTPATIENT)
Dept: GASTROENTEROLOGY | Facility: HOSPITAL | Age: 48
End: 2023-12-01
Payer: COMMERCIAL

## 2023-12-01 ENCOUNTER — HOSPITAL ENCOUNTER (OUTPATIENT)
Dept: GASTROENTEROLOGY | Facility: HOSPITAL | Age: 48
Setting detail: OUTPATIENT SURGERY
Discharge: HOME | End: 2023-12-01
Payer: COMMERCIAL

## 2023-12-01 ENCOUNTER — ANESTHESIA EVENT (OUTPATIENT)
Dept: GASTROENTEROLOGY | Facility: HOSPITAL | Age: 48
End: 2023-12-01
Payer: COMMERCIAL

## 2023-12-01 VITALS
DIASTOLIC BLOOD PRESSURE: 68 MMHG | RESPIRATION RATE: 18 BRPM | HEART RATE: 67 BPM | HEIGHT: 67 IN | BODY MASS INDEX: 35.31 KG/M2 | WEIGHT: 225 LBS | TEMPERATURE: 97.3 F | SYSTOLIC BLOOD PRESSURE: 142 MMHG | OXYGEN SATURATION: 98 %

## 2023-12-01 DIAGNOSIS — K92.1 MELENA: ICD-10-CM

## 2023-12-01 DIAGNOSIS — R19.4 CHANGE IN BOWEL HABITS: ICD-10-CM

## 2023-12-01 DIAGNOSIS — R13.19 ESOPHAGEAL DYSPHAGIA: ICD-10-CM

## 2023-12-01 LAB — HCG UR QL IA.RAPID: NEGATIVE

## 2023-12-01 PROCEDURE — 3700000002 HC GENERAL ANESTHESIA TIME - EACH INCREMENTAL 1 MINUTE

## 2023-12-01 PROCEDURE — 2500000005 HC RX 250 GENERAL PHARMACY W/O HCPCS: Performed by: NURSE ANESTHETIST, CERTIFIED REGISTERED

## 2023-12-01 PROCEDURE — 88305 TISSUE EXAM BY PATHOLOGIST: CPT | Performed by: PATHOLOGY

## 2023-12-01 PROCEDURE — 43239 EGD BIOPSY SINGLE/MULTIPLE: CPT | Performed by: STUDENT IN AN ORGANIZED HEALTH CARE EDUCATION/TRAINING PROGRAM

## 2023-12-01 PROCEDURE — 2500000004 HC RX 250 GENERAL PHARMACY W/ HCPCS (ALT 636 FOR OP/ED): Performed by: NURSE ANESTHETIST, CERTIFIED REGISTERED

## 2023-12-01 PROCEDURE — 7100000010 HC PHASE TWO TIME - EACH INCREMENTAL 1 MINUTE

## 2023-12-01 PROCEDURE — 7100000009 HC PHASE TWO TIME - INITIAL BASE CHARGE

## 2023-12-01 PROCEDURE — 81025 URINE PREGNANCY TEST: CPT | Performed by: STUDENT IN AN ORGANIZED HEALTH CARE EDUCATION/TRAINING PROGRAM

## 2023-12-01 PROCEDURE — A43239 PR EDG TRANSORAL BIOPSY SINGLE/MULTIPLE: Performed by: NURSE ANESTHETIST, CERTIFIED REGISTERED

## 2023-12-01 PROCEDURE — 45380 COLONOSCOPY AND BIOPSY: CPT | Performed by: STUDENT IN AN ORGANIZED HEALTH CARE EDUCATION/TRAINING PROGRAM

## 2023-12-01 PROCEDURE — 3700000001 HC GENERAL ANESTHESIA TIME - INITIAL BASE CHARGE

## 2023-12-01 PROCEDURE — 0753T DGTZ GLS MCRSCP SLD LEVEL IV: CPT | Mod: TC,SUR,STJLAB | Performed by: STUDENT IN AN ORGANIZED HEALTH CARE EDUCATION/TRAINING PROGRAM

## 2023-12-01 PROCEDURE — 87900 PHENOTYPE INFECT AGENT DRUG: CPT | Performed by: STUDENT IN AN ORGANIZED HEALTH CARE EDUCATION/TRAINING PROGRAM

## 2023-12-01 PROCEDURE — A43239 PR EDG TRANSORAL BIOPSY SINGLE/MULTIPLE: Performed by: ANESTHESIOLOGY

## 2023-12-01 RX ORDER — GLYCOPYRROLATE 0.2 MG/ML
INJECTION INTRAMUSCULAR; INTRAVENOUS AS NEEDED
Status: DISCONTINUED | OUTPATIENT
Start: 2023-12-01 | End: 2023-12-01

## 2023-12-01 RX ORDER — ONDANSETRON HYDROCHLORIDE 2 MG/ML
4 INJECTION, SOLUTION INTRAVENOUS ONCE AS NEEDED
Status: DISCONTINUED | OUTPATIENT
Start: 2023-12-01 | End: 2023-12-02 | Stop reason: HOSPADM

## 2023-12-01 RX ORDER — ONDANSETRON HYDROCHLORIDE 2 MG/ML
INJECTION, SOLUTION INTRAVENOUS AS NEEDED
Status: DISCONTINUED | OUTPATIENT
Start: 2023-12-01 | End: 2023-12-01

## 2023-12-01 RX ORDER — LIDOCAINE HYDROCHLORIDE 20 MG/ML
INJECTION, SOLUTION EPIDURAL; INFILTRATION; INTRACAUDAL; PERINEURAL AS NEEDED
Status: DISCONTINUED | OUTPATIENT
Start: 2023-12-01 | End: 2023-12-01

## 2023-12-01 RX ORDER — PROPOFOL 10 MG/ML
INJECTION, EMULSION INTRAVENOUS AS NEEDED
Status: DISCONTINUED | OUTPATIENT
Start: 2023-12-01 | End: 2023-12-01

## 2023-12-01 RX ORDER — SODIUM CHLORIDE, SODIUM LACTATE, POTASSIUM CHLORIDE, CALCIUM CHLORIDE 600; 310; 30; 20 MG/100ML; MG/100ML; MG/100ML; MG/100ML
20 INJECTION, SOLUTION INTRAVENOUS CONTINUOUS
Status: DISCONTINUED | OUTPATIENT
Start: 2023-12-01 | End: 2023-12-02 | Stop reason: HOSPADM

## 2023-12-01 RX ORDER — PROPOFOL 10 MG/ML
INJECTION, EMULSION INTRAVENOUS CONTINUOUS PRN
Status: DISCONTINUED | OUTPATIENT
Start: 2023-12-01 | End: 2023-12-01

## 2023-12-01 RX ADMIN — PROPOFOL 100 MG: 10 INJECTION, EMULSION INTRAVENOUS at 11:07

## 2023-12-01 RX ADMIN — PROPOFOL 100 MG: 10 INJECTION, EMULSION INTRAVENOUS at 10:54

## 2023-12-01 RX ADMIN — SODIUM CHLORIDE, SODIUM LACTATE, POTASSIUM CHLORIDE, AND CALCIUM CHLORIDE: 600; 310; 30; 20 INJECTION, SOLUTION INTRAVENOUS at 10:23

## 2023-12-01 RX ADMIN — PROPOFOL 50 MG: 10 INJECTION, EMULSION INTRAVENOUS at 11:03

## 2023-12-01 RX ADMIN — PROPOFOL 100 MCG/KG/MIN: 10 INJECTION, EMULSION INTRAVENOUS at 10:59

## 2023-12-01 RX ADMIN — PROPOFOL 100 MG: 10 INJECTION, EMULSION INTRAVENOUS at 11:01

## 2023-12-01 RX ADMIN — ONDANSETRON 4 MG: 2 INJECTION INTRAMUSCULAR; INTRAVENOUS at 10:52

## 2023-12-01 RX ADMIN — GLYCOPYRROLATE 0.2 MG: 0.2 INJECTION, SOLUTION INTRAMUSCULAR; INTRAVENOUS at 11:08

## 2023-12-01 RX ADMIN — PROPOFOL 50 MG: 10 INJECTION, EMULSION INTRAVENOUS at 10:56

## 2023-12-01 RX ADMIN — LIDOCAINE HYDROCHLORIDE 50 MG: 20 INJECTION, SOLUTION EPIDURAL; INFILTRATION; INTRACAUDAL; PERINEURAL at 10:52

## 2023-12-01 SDOH — HEALTH STABILITY: MENTAL HEALTH: CURRENT SMOKER: 0

## 2023-12-01 ASSESSMENT — PAIN - FUNCTIONAL ASSESSMENT
PAIN_FUNCTIONAL_ASSESSMENT: 0-10

## 2023-12-01 ASSESSMENT — PAIN SCALES - GENERAL
PAINLEVEL_OUTOF10: 0 - NO PAIN

## 2023-12-01 NOTE — H&P
Procedure H&P    Patient Profile-Procedures  Name Lashell Alcazar  Date of Birth 1975  MRN [id number]  Address   [address] [place] [place] [id number] [place] [address]    Primary Phone Number [phone]  Secondary Phone Number    Nelda Carmen    Procedure(s):  Procedures: EGD and Colonoscopy  Primary contact name and number   Extended Emergency Contact Information  Primary Emergency Contact: Zack Alcazar  Home Phone: [phone]  Work Phone: [phone]  Relation: Spouse    General Health  Weight   Vitals:    12/01/23 1022   Weight: 102 kg (225 lb)     BMI Body mass index is 35.24 kg/m².    Allergies  Allergies   Allergen Reactions    Bee Venom Protein (Honey Bee) Anaphylaxis    Tetracycline Anaphylaxis and Hives    Fentanyl Headache       Past Medical History   Past Medical History:   Diagnosis Date    Encounter for therapeutic drug level monitoring 10/12/2016    Encounter for monitoring sulfasalazine therapy    Other conditions influencing health status 09/09/2015    History of chronic diarrhea    Pain in left lower leg 09/09/2015    Pain in left shin    Pain in right lower leg 09/09/2015    Pain in right shin    Personal history of other diseases of the digestive system 07/08/2015    History of anal fissures       Provider assessment  Diagnosis:  CONSTIPATION, RECTAL PAIN, DYSPHAGIA   Medication Reviewed - yes  Prior to Admission medications    Medication Sig Start Date End Date Taking? Authorizing Provider   buPROPion (Wellbutrin) 100 mg tablet Take 3 tablets (300 mg) by mouth once daily.    Historical Provider, MD   cholecalciferol (Vitamin D-3) 50,000 unit capsule Take 1 capsule (50,000 Units) by mouth 1 (one) time per week. 11/9/21   Historical Provider, MD   cyanocobalamin (Vitamin B-12) 1,000 mcg/mL injection Inject 1 mL (1,000 mcg) into the muscle every 30 (thirty) days. 5/4/21   Historical Provider, MD   dapagliflozin propanediol (Farxiga) 5 mg Take 1 tablet (5 mg) by mouth once  daily in the morning. 2/14/22   Historical Provider, MD   dulaglutide (Trulicity) 1.5 mg/0.5 mL pen injector injection Inject 1.5 mg under the skin 1 (one) time per week. As directed 1/31/22   Historical Provider, MD   glipiZIDE (Glucotrol) 10 mg tablet Take 1 tablet (10 mg) by mouth 2 times a day. 12/13/21   Historical Provider, MD   ibuprofen 800 mg tablet Take 1 tablet (800 mg) by mouth every 8 hours. Take with food 11/29/23   Jolanta Wyatt, APRN-CNP   levonorgestrel (Mirena) 21 mcg/24 hours (8 yrs) 52 mg IUD 52 mg by intrauterine route 1 time.    Historical Provider, MD   losartan (Cozaar) 25 mg tablet Take 0.5 tablets (12.5 mg) by mouth once daily. 3/15/21   Historical Provider, MD   modafinil (Provigil) 200 mg tablet Take 1 tablet (200 mg) by mouth once daily.    Historical Provider, MD   omega 3-dha-epa-fish oil (Fish OiL) 300-1,000 mg capsule,delayed release(DR/EC) Take by mouth. 8/13/14   Historical Provider, MD   pantoprazole (ProtoNix) 40 mg EC tablet Take 1 tablet (40 mg) by mouth once daily. 6/10/21   Historical Provider, MD   psyllium (Metamucil) 0.4 gram capsule Take 5 capsules by mouth 4 times a day.    Historical Provider, MD   rosuvastatin (Crestor) 20 mg tablet Take 1 tablet (20 mg) by mouth once daily.    Historical Provider, MD   tirzepatide (Mounjaro) 7.5 mg/0.5 mL pen injector Inject 7.5 mg under the skin every 7 days.    Historical Provider, MD   ibuprofen 800 mg tablet Take 1 tablet (800 mg) by mouth every 8 hours. Take with food 12/9/15 11/29/23  Historical Provider, MD       Physical Exam  Vitals:    12/01/23 1022   BP: 141/73   Pulse: 80   Resp: 16   Temp: 36.1 °C (97 °F)   SpO2: 100%        General: A&Ox3, NAD.  HEENT: AT/NC.   CV: RRR. No murmur.  Resp: CTA bilaterally. No wheezing, rhonchi or rales.   GI: Soft, NT/ND. BSx4.  Extrem: No edema. Pulses intact.  Skin: No Jaundice.   Neuro: No focal deficits.   Psych: Normal mood and affect.      Procedure Plan - pre-procedural  (re)assesment completed by physician:  discharge/transfer patient when discharge criteria met    Nely Tovar MD  12/1/2023 10:38 AM

## 2023-12-01 NOTE — ADDENDUM NOTE
Encounter addended by: Dianne Langston RN on: 12/1/2023 1:02 PM   Actions taken: Order list changed, Actions taken from a BestPractice Advisory, Care Plan modified, SmartForm saved, Flowsheet macro applied, LDA properties accepted, Flowsheet accepted, Check Out activity completed

## 2023-12-01 NOTE — ANESTHESIA POSTPROCEDURE EVALUATION
Patient: Lashell Alcazar    Procedure Summary       Date: 12/01/23 Room / Location: [facility]    Anesthesia Start: 1048 Anesthesia Stop: 1144    Procedures:       COLONOSCOPY      EGD Diagnosis:       Change in bowel habits      Melena      Esophageal dysphagia    Scheduled Providers: Nely Tovar MD Responsible Provider: Emma Smith MD    Anesthesia Type: MAC ASA Status: 3            Anesthesia Type: MAC    Vitals Value Taken Time   /56 12/01/23 1144   Temp 36.0 12/01/23 1144   Pulse 96 12/01/23 1144   Resp 16 12/01/23 1144   SpO2 96 12/01/23 1144       Anesthesia Post Evaluation    Patient location during evaluation: PACU  Patient participation: complete - patient participated  Level of consciousness: awake  Pain management: adequate  Airway patency: patent  Cardiovascular status: acceptable  Respiratory status: acceptable  Hydration status: acceptable  Postoperative Nausea and Vomiting: none      No notable events documented.

## 2023-12-01 NOTE — ANESTHESIA PREPROCEDURE EVALUATION
Patient: Lashell Alcazar    Procedure Information       Anesthesia Start Date/Time: 12/01/23 1048    Scheduled providers: Nely Tovar MD    Procedures:       COLONOSCOPY      EGD    Location: [facility] [place]            Relevant Problems   Cardiovascular   (+) Benign essential hypertension   (+) Chest pain, pleuritic   (+) Hyperlipidemia      Endocrine   (+) Class 2 severe obesity with body mass index (BMI) of 35 to 39.9 with serious comorbidity (CMS/HCC)      GI   (+) GERD (gastroesophageal reflux disease)   (+) Hiatal hernia      Pulmonary   (+) Obstructive sleep apnea      Hematology   (+) Anemia      Musculoskeletal   (+) Rheumatoid arthritis (CMS/HCC)      Other   (+) Lateral epicondylitis of right elbow   (+) Rheumatoid arthritis (CMS/HCC)       Clinical information reviewed:   Tobacco  Allergies  Meds   Med Hx  Surg Hx   Fam Hx  Soc Hx        NPO Detail:  NPO/Void Status  Date of Last Liquid: 12/01/23  Time of Last Liquid: 0600  Date of Last Solid: 11/29/23         Physical Exam    Airway  Mallampati: III  TM distance: >3 FB  Neck ROM: full     Cardiovascular - normal exam     Dental - normal exam     Pulmonary - normal exam     Abdominal - normal exam           Vitals:    12/01/23 1022   BP: 141/73   Pulse: 80   Resp: 16   Temp: 36.1 °C (97 °F)   SpO2: 100%       Past Surgical History:   Procedure Laterality Date    CARPAL TUNNEL RELEASE  08/13/2014    Neuroplasty Decompression Median Nerve At Carpal Tunnel    OTHER SURGICAL HISTORY  02/19/2020    Colonoscopy    OTHER SURGICAL HISTORY  02/19/2020    Esophagogastroduodenoscopy    OTHER SURGICAL HISTORY  02/03/2020    Catheter ablation    OTHER SURGICAL HISTORY  10/12/2019    Foot surgery    TUBAL LIGATION  08/13/2014    Tubal Ligation     Past Medical History:   Diagnosis Date    Encounter for therapeutic drug level monitoring 10/12/2016    Encounter for monitoring sulfasalazine therapy    Other conditions influencing health status  09/09/2015    History of chronic diarrhea    Pain in left lower leg 09/09/2015    Pain in left shin    Pain in right lower leg 09/09/2015    Pain in right shin    Personal history of other diseases of the digestive system 07/08/2015    History of anal fissures       Current Outpatient Medications:     buPROPion (Wellbutrin) 100 mg tablet, Take 3 tablets (300 mg) by mouth once daily., Disp: , Rfl:     cholecalciferol (Vitamin D-3) 50,000 unit capsule, Take 1 capsule (50,000 Units) by mouth 1 (one) time per week., Disp: , Rfl:     cyanocobalamin (Vitamin B-12) 1,000 mcg/mL injection, Inject 1 mL (1,000 mcg) into the muscle every 30 (thirty) days., Disp: , Rfl:     dapagliflozin propanediol (Farxiga) 5 mg, Take 1 tablet (5 mg) by mouth once daily in the morning., Disp: , Rfl:     dulaglutide (Trulicity) 1.5 mg/0.5 mL pen injector injection, Inject 1.5 mg under the skin 1 (one) time per week. As directed, Disp: , Rfl:     glipiZIDE (Glucotrol) 10 mg tablet, Take 1 tablet (10 mg) by mouth 2 times a day., Disp: , Rfl:     ibuprofen 800 mg tablet, Take 1 tablet (800 mg) by mouth every 8 hours. Take with food, Disp: 90 tablet, Rfl: 0    levonorgestrel (Mirena) 21 mcg/24 hours (8 yrs) 52 mg IUD, 52 mg by intrauterine route 1 time., Disp: , Rfl:     losartan (Cozaar) 25 mg tablet, Take 0.5 tablets (12.5 mg) by mouth once daily., Disp: , Rfl:     modafinil (Provigil) 200 mg tablet, Take 1 tablet (200 mg) by mouth once daily., Disp: , Rfl:     omega 3-dha-epa-fish oil (Fish OiL) 300-1,000 mg capsule,delayed release(DR/EC), Take by mouth., Disp: , Rfl:     pantoprazole (ProtoNix) 40 mg EC tablet, Take 1 tablet (40 mg) by mouth once daily., Disp: , Rfl:     psyllium (Metamucil) 0.4 gram capsule, Take 5 capsules by mouth 4 times a day., Disp: , Rfl:     rosuvastatin (Crestor) 20 mg tablet, Take 1 tablet (20 mg) by mouth once daily., Disp: , Rfl:     tirzepatide (Mounjaro) 7.5 mg/0.5 mL pen injector, Inject 7.5 mg under the skin  every 7 days., Disp: , Rfl:   No current facility-administered medications for this encounter.    Facility-Administered Medications Ordered in Other Encounters:     lactated Ringer's bolus, , intravenous, Continuous PRN, LEYDI Herrera, New Bag at 12/01/23 1023    lidocaine PF (Xylocaine) 20 mg/mL (2 %) injection, , epidural, PRN, LEYDI Herrera, 50 mg at 12/01/23 1052    ondansetron (Zofran) injection, , intravenous, PRN, LEYDI Herrera, 4 mg at 12/01/23 1052    propofol (Diprivan) infusion, , intravenous, Continuous PRN, LEYDI Herrera, Last Rate: 61.26 mL/hr at 12/01/23 1059, 100 mcg/kg/min at 12/01/23 1059    propofol (Diprivan) injection, , intravenous, PRN, LEYDI Herrera, 50 mg at 12/01/23 1056  Prior to Admission medications    Medication Sig Start Date End Date Taking? Authorizing Provider   buPROPion (Wellbutrin) 100 mg tablet Take 3 tablets (300 mg) by mouth once daily.    Historical Provider, MD   cholecalciferol (Vitamin D-3) 50,000 unit capsule Take 1 capsule (50,000 Units) by mouth 1 (one) time per week. 11/9/21   Historical Provider, MD   cyanocobalamin (Vitamin B-12) 1,000 mcg/mL injection Inject 1 mL (1,000 mcg) into the muscle every 30 (thirty) days. 5/4/21   Historical Provider, MD   dapagliflozin propanediol (Farxiga) 5 mg Take 1 tablet (5 mg) by mouth once daily in the morning. 2/14/22   Historical Provider, MD   dulaglutide (Trulicity) 1.5 mg/0.5 mL pen injector injection Inject 1.5 mg under the skin 1 (one) time per week. As directed 1/31/22   Historical Provider, MD   glipiZIDE (Glucotrol) 10 mg tablet Take 1 tablet (10 mg) by mouth 2 times a day. 12/13/21   Historical Provider, MD   ibuprofen 800 mg tablet Take 1 tablet (800 mg) by mouth every 8 hours. Take with food 11/29/23   DEDE Diaz   levonorgestrel (Mirena) 21 mcg/24 hours (8 yrs) 52 mg IUD 52 mg by intrauterine route 1 time.    Historical Provider,  MD   losartan (Cozaar) 25 mg tablet Take 0.5 tablets (12.5 mg) by mouth once daily. 3/15/21   Historical Provider, MD   modafinil (Provigil) 200 mg tablet Take 1 tablet (200 mg) by mouth once daily.    Historical Provider, MD   omega 3-dha-epa-fish oil (Fish OiL) 300-1,000 mg capsule,delayed release(DR/EC) Take by mouth. 8/13/14   Historical Provider, MD   pantoprazole (ProtoNix) 40 mg EC tablet Take 1 tablet (40 mg) by mouth once daily. 6/10/21   Historical Provider, MD   psyllium (Metamucil) 0.4 gram capsule Take 5 capsules by mouth 4 times a day.    Historical Provider, MD   rosuvastatin (Crestor) 20 mg tablet Take 1 tablet (20 mg) by mouth once daily.    Historical Provider, MD   tirzepatide (Mounjaro) 7.5 mg/0.5 mL pen injector Inject 7.5 mg under the skin every 7 days.    Historical Provider, MD   ibuprofen 800 mg tablet Take 1 tablet (800 mg) by mouth every 8 hours. Take with food 12/9/15 11/29/23  Historical Provider, MD     Allergies   Allergen Reactions    Bee Venom Protein (Honey Bee) Anaphylaxis    Tetracycline Anaphylaxis and Hives    Fentanyl Headache     Social History     Tobacco Use    Smoking status: Former     Types: Cigarettes    Smokeless tobacco: Not on file   Substance Use Topics    Alcohol use: Yes     Alcohol/week: 8.0 standard drinks of alcohol     Types: 8 Standard drinks or equivalent per week         Chemistry    Lab Results   Component Value Date/Time     10/23/2023 1019    K 4.4 10/23/2023 1019     (H) 10/23/2023 1019    CO2 27 10/23/2023 1019    BUN 12 10/23/2023 1019    CREATININE 0.70 10/23/2023 1019    Lab Results   Component Value Date/Time    CALCIUM 9.7 10/23/2023 1019    ALKPHOS 50 02/03/2020 1216    AST 13 02/03/2020 1216    ALT 29 02/03/2020 1216    BILITOT 0.3 02/03/2020 1216          Lab Results   Component Value Date/Time    WBC 7.8 10/23/2023 1019    HGB 13.2 10/23/2023 1019    HCT 39.6 10/23/2023 1019     10/23/2023 1019     No results found for:  "\"PROTIME\", \"PTT\", \"INR\"  No results found for this or any previous visit (from the past 4464 hour(s)).     Anesthesia Plan    ASA 3     MAC     The patient is not a current smoker.    intravenous induction   Anesthetic plan and risks discussed with patient.    Plan discussed with CRNA.      "

## 2023-12-12 LAB
LAB AP ASR DISCLAIMER: NORMAL
LABORATORY COMMENT REPORT: NORMAL
PATH REPORT.FINAL DX SPEC: NORMAL
PATH REPORT.GROSS SPEC: NORMAL
PATH REPORT.RELEVANT HX SPEC: NORMAL
PATH REPORT.TOTAL CANCER: NORMAL

## 2023-12-14 DIAGNOSIS — K29.70 HELICOBACTER PYLORI GASTRITIS: Primary | ICD-10-CM

## 2023-12-14 DIAGNOSIS — B96.81 HELICOBACTER PYLORI GASTRITIS: Primary | ICD-10-CM

## 2023-12-14 RX ORDER — METRONIDAZOLE 500 MG/1
500 TABLET ORAL 2 TIMES DAILY
Qty: 28 TABLET | Refills: 0 | Status: SHIPPED | OUTPATIENT
Start: 2023-12-14 | End: 2023-12-14 | Stop reason: WASHOUT

## 2023-12-14 RX ORDER — CLARITHROMYCIN 500 MG/1
500 TABLET, FILM COATED ORAL 2 TIMES DAILY
Qty: 28 TABLET | Refills: 0 | Status: SHIPPED | OUTPATIENT
Start: 2023-12-14 | End: 2023-12-14 | Stop reason: WASHOUT

## 2023-12-14 RX ORDER — OMEPRAZOLE 20 MG/1
20 TABLET, DELAYED RELEASE ORAL 2 TIMES DAILY
Qty: 28 TABLET | Refills: 0 | Status: SHIPPED | OUTPATIENT
Start: 2023-12-14 | End: 2023-12-14 | Stop reason: WASHOUT

## 2023-12-14 NOTE — LETTER
December 14, 2023     Lashell Alcazar      Dear Ms. Alcazar:    The results of your upper endoscopy showed an infection called Hpylori. This infection is a bacterial infection that can be treated with a regimen of antibiotics. I have sent a script to your pharmacy with the following instructions. You will be started on this specific regimen due to history of allergy to tetracycline.     -     metroNIDAZOLE (Flagyl) 500 mg tablet; Take 1 tablet (500 mg) by mouth 3 times a day for 14 days.  -     clarithromycin 500 mg; take 1 tablet by mouth 2 times a day for 14 days   -    Amoxicillin 1 g; take 1 tablet by mouth 2 times a day for 14 days  -     omeprazole OTC (PriLOSEC OTC) 20 mg EC tablet; Take 1 tablet (20 mg) by mouth 2 times a day for 14 days. Do not crush, chew, or split.    Fourteen days after completion of above medications, will plan to test for eradication with breath test. Please ensure you have been OFF PPI therapy for at least 14 days prior to testing as PPI therapy may lead to false negative results      Pt agreeable with plan and all questions answered.          If you have any questions or concerns, please do not hesitate to call.         Sincerely,        Nely Tovar MD        CC: Gloria Gonzalez PA-C

## 2023-12-14 NOTE — LETTER
December 14, 2023     Lashell Alcazar      Dear Ms. Alcazar:        If you have any questions or concerns, please do not hesitate to call.         Sincerely,        Nely Tovar MD        CC: No Recipients

## 2023-12-18 ENCOUNTER — DOCUMENTATION (OUTPATIENT)
Dept: PRIMARY CARE | Facility: CLINIC | Age: 48
End: 2023-12-18
Payer: COMMERCIAL

## 2023-12-20 LAB
ELECTRONICALLY SIGNED BY: NORMAL
H. PYLORI DRUG SUSCEPTIBILITY RESULTS: NORMAL

## 2023-12-27 ENCOUNTER — TELEPHONE (OUTPATIENT)
Dept: GASTROENTEROLOGY | Facility: CLINIC | Age: 48
End: 2023-12-27
Payer: COMMERCIAL

## 2023-12-27 DIAGNOSIS — A04.8 BACTERIAL INFECTION DUE TO H. PYLORI: Primary | ICD-10-CM

## 2023-12-27 RX ORDER — CLARITHROMYCIN 500 MG/1
500 TABLET, FILM COATED ORAL 2 TIMES DAILY
Qty: 28 TABLET | Refills: 0 | Status: SHIPPED | OUTPATIENT
Start: 2023-12-27 | End: 2024-01-10

## 2023-12-27 RX ORDER — AMOXICILLIN 500 MG/1
1000 CAPSULE ORAL 2 TIMES DAILY
Qty: 56 CAPSULE | Refills: 0 | Status: SHIPPED | OUTPATIENT
Start: 2023-12-27 | End: 2024-01-10

## 2023-12-27 RX ORDER — PANTOPRAZOLE SODIUM 40 MG/1
40 TABLET, DELAYED RELEASE ORAL 2 TIMES DAILY
Qty: 28 TABLET | Refills: 0 | Status: SHIPPED | OUTPATIENT
Start: 2023-12-27

## 2023-12-27 NOTE — TELEPHONE ENCOUNTER
Unable to reach patient,  left and [employer] message sent    called patient to discuss results of Hpylori testing from recent EGD. Susceptibility testing showing option for triple therapy with Clarithromycin, amoxicillin and PPI. Prescription sent to pharmacy.     Fourteen days after completion of above medications, will need to test for eradication with breath test. Needs to be OFF PPI therapy for at least 14 days prior to testing as PPI therapy may lead to false negative results    Nely Tovar MD

## 2023-12-29 ENCOUNTER — TELEPHONE (OUTPATIENT)
Dept: GASTROENTEROLOGY | Facility: CLINIC | Age: 48
End: 2023-12-29
Payer: COMMERCIAL

## 2023-12-29 NOTE — TELEPHONE ENCOUNTER
----- Message from Venus Garay MA sent at 12/29/2023  9:01 AM EST -----  Regarding: FW: follow up    ----- Message -----  From: Gloria Gonzalez PA-C  Sent: 12/28/2023   8:17 AM EST  To: Venus Garay MA  Subject: follow up                                        Please have patient follow up in 4-6 weeks

## 2024-01-08 ENCOUNTER — OFFICE VISIT (OUTPATIENT)
Dept: PRIMARY CARE | Facility: CLINIC | Age: 49
End: 2024-01-08
Payer: COMMERCIAL

## 2024-01-08 VITALS
WEIGHT: 216 LBS | OXYGEN SATURATION: 96 % | DIASTOLIC BLOOD PRESSURE: 79 MMHG | SYSTOLIC BLOOD PRESSURE: 131 MMHG | HEART RATE: 82 BPM | RESPIRATION RATE: 20 BRPM | BODY MASS INDEX: 33.9 KG/M2 | HEIGHT: 67 IN | TEMPERATURE: 97 F

## 2024-01-08 DIAGNOSIS — G47.33 OBSTRUCTIVE SLEEP APNEA: ICD-10-CM

## 2024-01-08 DIAGNOSIS — R00.2 PALPITATIONS: ICD-10-CM

## 2024-01-08 DIAGNOSIS — K44.9 HIATAL HERNIA: ICD-10-CM

## 2024-01-08 DIAGNOSIS — A04.8 H. PYLORI INFECTION: ICD-10-CM

## 2024-01-08 DIAGNOSIS — K21.9 GASTROESOPHAGEAL REFLUX DISEASE WITHOUT ESOPHAGITIS: ICD-10-CM

## 2024-01-08 DIAGNOSIS — E78.5 HYPERLIPIDEMIA, UNSPECIFIED HYPERLIPIDEMIA TYPE: ICD-10-CM

## 2024-01-08 DIAGNOSIS — N95.1 PERIMENOPAUSE: ICD-10-CM

## 2024-01-08 DIAGNOSIS — I10 BENIGN ESSENTIAL HYPERTENSION: ICD-10-CM

## 2024-01-08 DIAGNOSIS — M06.9 RHEUMATOID ARTHRITIS, INVOLVING UNSPECIFIED SITE, UNSPECIFIED WHETHER RHEUMATOID FACTOR PRESENT (MULTI): ICD-10-CM

## 2024-01-08 DIAGNOSIS — F33.0 MILD EPISODE OF RECURRENT MAJOR DEPRESSIVE DISORDER (CMS-HCC): ICD-10-CM

## 2024-01-08 DIAGNOSIS — E11.65 UNCONTROLLED TYPE 2 DIABETES MELLITUS WITH HYPERGLYCEMIA (MULTI): Primary | ICD-10-CM

## 2024-01-08 PROBLEM — F32.A DEPRESSION: Status: ACTIVE | Noted: 2023-02-22

## 2024-01-08 PROBLEM — M16.0 ARTHRITIS OF BOTH HIPS: Chronic | Status: ACTIVE | Noted: 2018-07-05

## 2024-01-08 PROBLEM — E78.00 PURE HYPERCHOLESTEROLEMIA: Status: ACTIVE | Noted: 2022-06-15

## 2024-01-08 PROBLEM — M72.2 PLANTAR FASCIITIS OF LEFT FOOT: Status: ACTIVE | Noted: 2018-07-05

## 2024-01-08 PROCEDURE — 99204 OFFICE O/P NEW MOD 45 MIN: CPT | Performed by: NURSE PRACTITIONER

## 2024-01-08 RX ORDER — INSULIN PUMP SYRINGE, 3 ML
EACH MISCELLANEOUS
COMMUNITY
Start: 2023-11-02

## 2024-01-08 ASSESSMENT — ENCOUNTER SYMPTOMS
PALPITATIONS: 0
ANAL BLEEDING: 1
DIARRHEA: 0
ABDOMINAL PAIN: 0
NAUSEA: 0
VOMITING: 0
WHEEZING: 0
CHILLS: 0
SHORTNESS OF BREATH: 0
RECTAL PAIN: 1
FEVER: 0
COUGH: 0
FATIGUE: 0
UNEXPECTED WEIGHT CHANGE: 0

## 2024-01-08 NOTE — ASSESSMENT & PLAN NOTE
Has been on Protonix for 20 years, currently on 40 mg BID, but states she often only takes it once.

## 2024-01-08 NOTE — PROGRESS NOTES
"Subjective   Patient ID: Lashell Alcazar is a 48 y.o. female who presents for Establish Care (Is on DM meds. Sees endo-dr flores/Is on atb for h pylori. ).  Review of Systems   Constitutional:  Negative for chills, fatigue, fever and unexpected weight change.   Respiratory:  Negative for cough, shortness of breath and wheezing.    Cardiovascular:  Negative for chest pain, palpitations and leg swelling.   Gastrointestinal:  Positive for anal bleeding (has hemorroids) and rectal pain (during BMs, stabbing pain). Negative for abdominal pain, diarrhea, nausea and vomiting.   Genitourinary:         Has Mirena IUD.  Pt suspects she has pelvic organ prolapse. States she has to \"push is back in.\"    Objective   /79   Pulse 82   Temp 36.1 °C (97 °F)   Resp 20   Ht 1.702 m (5' 7\")   Wt 98 kg (216 lb)   LMP  (LMP Unknown)   SpO2 96%   BMI 33.83 kg/m²   Physical Exam  Vitals reviewed.   Constitutional:       Appearance: Normal appearance.   HENT:      Head: Normocephalic.   Eyes:      Conjunctiva/sclera: Conjunctivae normal.   Cardiovascular:      Rate and Rhythm: Normal rate and regular rhythm.      Pulses: Normal pulses.   Pulmonary:      Effort: Pulmonary effort is normal.      Breath sounds: Normal breath sounds.   Abdominal:      General: Bowel sounds are normal.      Palpations: Abdomen is soft.   Musculoskeletal:      Cervical back: Neck supple.      Right lower leg: No edema.      Left lower leg: No edema.   Skin:     General: Skin is warm and dry.   Neurological:      General: No focal deficit present.      Mental Status: She is alert and oriented to person, place, and time.   Psychiatric:         Mood and Affect: Mood normal.         Thought Content: Thought content normal.     Assessment/Plan   1. Uncontrolled type 2 diabetes mellitus with hyperglycemia (CMS/HCC)        2. Benign essential hypertension        3. Hyperlipidemia, unspecified hyperlipidemia type        4. Palpitations        5. Perimenopause  "       6. Mild episode of recurrent major depressive disorder (CMS/HCC)        7. Obstructive sleep apnea        8. Rheumatoid arthritis, involving unspecified site, unspecified whether rheumatoid factor present (CMS/HCC)        9. H. pylori infection        10. Gastroesophageal reflux disease without esophagitis        11. Hiatal hernia        Follow-up in 6 months.

## 2024-01-10 ENCOUNTER — TELEPHONE (OUTPATIENT)
Dept: GASTROENTEROLOGY | Facility: CLINIC | Age: 49
End: 2024-01-10
Payer: COMMERCIAL

## 2024-01-15 ENCOUNTER — APPOINTMENT (OUTPATIENT)
Dept: ENDOCRINOLOGY | Facility: CLINIC | Age: 49
End: 2024-01-15
Payer: COMMERCIAL

## 2024-01-23 ENCOUNTER — TELEPHONE (OUTPATIENT)
Dept: GASTROENTEROLOGY | Facility: CLINIC | Age: 49
End: 2024-01-23
Payer: COMMERCIAL

## 2024-02-05 ENCOUNTER — PATIENT MESSAGE (OUTPATIENT)
Dept: ENDOCRINOLOGY | Age: 49
End: 2024-02-05

## 2024-02-05 DIAGNOSIS — E11.69 TYPE 2 DIABETES MELLITUS WITH OTHER SPECIFIED COMPLICATION, WITHOUT LONG-TERM CURRENT USE OF INSULIN (HCC): Primary | ICD-10-CM

## 2024-02-05 RX ORDER — TIRZEPATIDE 10 MG/.5ML
10 INJECTION, SOLUTION SUBCUTANEOUS WEEKLY
Qty: 12 ADJUSTABLE DOSE PRE-FILLED PEN SYRINGE | Refills: 3 | Status: SHIPPED | OUTPATIENT
Start: 2024-02-05

## 2024-02-05 RX ORDER — TIRZEPATIDE 10 MG/.5ML
10 INJECTION, SOLUTION SUBCUTANEOUS WEEKLY
Qty: 12 ADJUSTABLE DOSE PRE-FILLED PEN SYRINGE | Refills: 3 | Status: SHIPPED | OUTPATIENT
Start: 2024-02-05 | End: 2024-02-05 | Stop reason: SDUPTHER

## 2024-02-05 NOTE — TELEPHONE ENCOUNTER
From: Rupinder Chávez  To: Dr. Javier Crespo  Sent: 2/5/2024 2:01 PM EST  Subject: Medication     I requested a refill on my mounjaro. I have 1 shot left. Can out urgent on the request so that it gets filled quickly.    Thank you

## 2024-02-06 ENCOUNTER — LAB (OUTPATIENT)
Dept: LAB | Facility: LAB | Age: 49
End: 2024-02-06
Payer: COMMERCIAL

## 2024-02-06 DIAGNOSIS — A04.8 BACTERIAL INFECTION DUE TO H. PYLORI: ICD-10-CM

## 2024-02-06 PROCEDURE — 83013 H PYLORI (C-13) BREATH: CPT

## 2024-02-07 LAB — UREA BREATH TEST QL: NEGATIVE

## 2024-02-08 RX ORDER — TIRZEPATIDE 10 MG/.5ML
INJECTION, SOLUTION SUBCUTANEOUS
COMMUNITY
Start: 2024-02-06

## 2024-02-14 DIAGNOSIS — I10 BENIGN ESSENTIAL HYPERTENSION: ICD-10-CM

## 2024-02-14 RX ORDER — LOSARTAN POTASSIUM 25 MG/1
12.5 TABLET ORAL DAILY
Qty: 45 TABLET | Refills: 0 | Status: CANCELLED | OUTPATIENT
Start: 2024-02-14

## 2024-02-14 RX ORDER — LOSARTAN POTASSIUM 25 MG/1
12.5 TABLET ORAL DAILY
Qty: 45 TABLET | Refills: 3 | Status: SHIPPED | OUTPATIENT
Start: 2024-02-14 | End: 2024-04-10 | Stop reason: SDUPTHER

## 2024-02-14 NOTE — TELEPHONE ENCOUNTER
Patient requests prescription below    Last Office Visit: 1/8/2024     Requested Prescriptions     Pending Prescriptions Disp Refills    losartan (Cozaar) 25 mg tablet 45 tablet 2     Sig: Take 0.5 tablets (12.5 mg) by mouth once daily.

## 2024-02-26 ENCOUNTER — OFFICE VISIT (OUTPATIENT)
Dept: GASTROENTEROLOGY | Facility: CLINIC | Age: 49
End: 2024-02-26
Payer: COMMERCIAL

## 2024-02-26 VITALS
RESPIRATION RATE: 16 BRPM | DIASTOLIC BLOOD PRESSURE: 78 MMHG | OXYGEN SATURATION: 98 % | HEIGHT: 67 IN | WEIGHT: 212 LBS | BODY MASS INDEX: 33.27 KG/M2 | HEART RATE: 68 BPM | SYSTOLIC BLOOD PRESSURE: 119 MMHG

## 2024-02-26 DIAGNOSIS — R14.0 ABDOMINAL BLOATING: Primary | ICD-10-CM

## 2024-02-26 DIAGNOSIS — R13.19 ESOPHAGEAL DYSPHAGIA: ICD-10-CM

## 2024-02-26 PROCEDURE — 3078F DIAST BP <80 MM HG: CPT | Performed by: NURSE PRACTITIONER

## 2024-02-26 PROCEDURE — 4010F ACE/ARB THERAPY RXD/TAKEN: CPT | Performed by: NURSE PRACTITIONER

## 2024-02-26 PROCEDURE — 3074F SYST BP LT 130 MM HG: CPT | Performed by: NURSE PRACTITIONER

## 2024-02-26 PROCEDURE — 99213 OFFICE O/P EST LOW 20 MIN: CPT | Performed by: NURSE PRACTITIONER

## 2024-02-26 NOTE — PATIENT INSTRUCTIONS
-H pylor stool antigen test. Hold pantoprazole 14 days prior to submitting stool test.  -Esophageal manometry   -Follow up after results obtained   [phone]

## 2024-02-26 NOTE — PROGRESS NOTES
Subjective   Patient ID: Lashell Alcazar is a 48 y.o. female who presents for Follow-up (States that she has been feeling better. Recently treated for h.pylori, finished treatment.).  [facility] 10/23/23 with Gloria RAPP:   Colon polyps  Last colonoscopy was March 2020 with 4 polyps removed, all less than 10 mm.  2 were hyperplastic and 2 were tubular adenomas. Repeat screening 2025     Constipation, chronic  Worsening with melena and rectal pain  Takes metamucil daily (tablets) will add miralax at this time. Low concern for colorectal cancer given up to date with screening. May need to consider pelvic floor therapy     Melena  Intermittent, occurs after having hard stools. Described as sticky.      Rectal pain  Occurs with baring down with urination and defecation. No dyspareunia. Up to date on pap smears. Discussed with patient she will need to address this with her PCP and gynecologist as well     Dysphagia  Occurs with solids, liquids, and salvia. Most likely motility dysfunction. Unremarkable EGD 2020        Lashell was seen today for colon cancer screening, constipation and gerd.  Diagnoses and all orders for this visit:  Change in bowel habits  -     Colonoscopy Diagnostic; Future  -     EGD; Future  -     CT abdomen pelvis w IV contrast; Future  -     Basic metabolic panel; Future  Melena  -     CBC; Future  -     Iron and TIBC; Future  -     Ferritin; Future  -     EGD; Future  -     Basic metabolic panel; Future  Esophageal dysphagia  -     EGD; Future  -     Basic metabolic panel; Future       Patient following up after completing H. pylori treatment.  HP breath test returned negative.  However, patient was traveling in [place] and her symptoms have returned.  Now with abdominal bloating and change in bowel habits.  No significant diarrhea, melena or hematochezia.  No abdominal pain.    Patient with ongoing complaints of dysphagia despite PPI twice daily.  Takes PPI twice daily with good response to GERD,  unable to wean down or off.      -> Colonoscopy 12/1/2023: Colon mucosa appeared normal.  Random colon biopsies, returned unremarkable  -> EGD 12/1/2023: All observed location appeared normal, including esophagus, stomach and duodenum.  Z-line is 36 from incisors.  Pathology negative for EOE, but stomach biopsies positive for H. pylori.  Recommend manometry study for further evaluation of regurgitation.    Review of Systems   All other systems reviewed and are negative.      Objective   Physical Exam  Vitals reviewed.   Constitutional:       General: She is awake. She is not in acute distress.     Appearance: Normal appearance. She is not ill-appearing, toxic-appearing or diaphoretic.   HENT:      Head: Normocephalic and atraumatic.      Nose: Nose normal.      Mouth/Throat:      Mouth: Mucous membranes are moist.   Eyes:      General: No scleral icterus.     Pupils: Pupils are equal, round, and reactive to light.   Cardiovascular:      Comments: No cyanosis   Pulmonary:      Effort: Pulmonary effort is normal. No respiratory distress.   Musculoskeletal:         General: Normal range of motion.      Cervical back: Normal range of motion.   Skin:     Coloration: Skin is not jaundiced or pale.   Neurological:      Mental Status: She is alert and oriented to person, place, and time. Mental status is at baseline.      Motor: No weakness.      Gait: Gait normal.   Psychiatric:         Attention and Perception: Attention and perception normal.         Mood and Affect: Mood normal.         Behavior: Behavior normal. Behavior is cooperative.         Assessment/Plan   Diagnoses and all orders for this visit:  Abdominal bloating  48-year-old female with a constellation of GI complaints after taking a trip to [place].  History of H. pylori, cleared bacteria with quadruple therapy.  But symptoms have returned.  Check HP stool antigen test, but will need to hold PPI 14 days prior to submitting test.  -     H. Pylori Antigen,  Stool; Future  Esophageal dysphagia  Ongoing complaints of easy regurgitation and dysphagia on occasion.  Dr. Tovar recommends EMOT. S/P EGD detailed in the HPI.  Follow up pending results.  -     Esophageal Manometry; Future           FELIX Cooper-CNP 02/26/24 2:45 PM

## 2024-03-01 ENCOUNTER — TELEPHONE (OUTPATIENT)
Dept: GASTROENTEROLOGY | Facility: HOSPITAL | Age: 49
End: 2024-03-01

## 2024-03-12 DIAGNOSIS — E11.69 TYPE 2 DIABETES MELLITUS WITH OTHER SPECIFIED COMPLICATION, WITHOUT LONG-TERM CURRENT USE OF INSULIN (HCC): ICD-10-CM

## 2024-03-12 LAB
ANION GAP SERPL CALCULATED.3IONS-SCNC: 15 MEQ/L (ref 9–15)
BUN SERPL-MCNC: 21 MG/DL (ref 6–20)
CALCIUM SERPL-MCNC: 9.5 MG/DL (ref 8.5–9.9)
CHLORIDE SERPL-SCNC: 106 MEQ/L (ref 95–107)
CO2 SERPL-SCNC: 25 MEQ/L (ref 20–31)
CREAT SERPL-MCNC: 0.78 MG/DL (ref 0.5–0.9)
GLUCOSE SERPL-MCNC: 115 MG/DL (ref 70–99)
HBA1C MFR BLD: 5.3 % (ref 4.8–5.9)
POTASSIUM SERPL-SCNC: 4.3 MEQ/L (ref 3.4–4.9)
SODIUM SERPL-SCNC: 146 MEQ/L (ref 135–144)

## 2024-03-18 ENCOUNTER — TELEPHONE (OUTPATIENT)
Dept: GASTROENTEROLOGY | Facility: CLINIC | Age: 49
End: 2024-03-18
Payer: COMMERCIAL

## 2024-03-18 NOTE — TELEPHONE ENCOUNTER
Scheduling has not been able to contact pt to schedule EMOT. They've reached out 4 times. I also tried to reach out to pt to see if she is still interested in EMOT, no answer.

## 2024-04-01 ENCOUNTER — OFFICE VISIT (OUTPATIENT)
Dept: ENDOCRINOLOGY | Age: 49
End: 2024-04-01
Payer: COMMERCIAL

## 2024-04-01 VITALS
OXYGEN SATURATION: 98 % | DIASTOLIC BLOOD PRESSURE: 75 MMHG | HEART RATE: 74 BPM | HEIGHT: 67 IN | BODY MASS INDEX: 33.27 KG/M2 | SYSTOLIC BLOOD PRESSURE: 114 MMHG | WEIGHT: 212 LBS

## 2024-04-01 DIAGNOSIS — E11.69 TYPE 2 DIABETES MELLITUS WITH OTHER SPECIFIED COMPLICATION, WITHOUT LONG-TERM CURRENT USE OF INSULIN (HCC): Primary | ICD-10-CM

## 2024-04-01 LAB
CHP ED QC CHECK: NORMAL
GLUCOSE BLD-MCNC: 104 MG/DL

## 2024-04-01 PROCEDURE — 99213 OFFICE O/P EST LOW 20 MIN: CPT | Performed by: INTERNAL MEDICINE

## 2024-04-01 PROCEDURE — 82962 GLUCOSE BLOOD TEST: CPT | Performed by: INTERNAL MEDICINE

## 2024-04-01 PROCEDURE — 3044F HG A1C LEVEL LT 7.0%: CPT | Performed by: INTERNAL MEDICINE

## 2024-04-01 RX ORDER — TIRZEPATIDE 12.5 MG/.5ML
12.5 INJECTION, SOLUTION SUBCUTANEOUS WEEKLY
Qty: 4 ADJUSTABLE DOSE PRE-FILLED PEN SYRINGE | Refills: 3 | Status: SHIPPED | OUTPATIENT
Start: 2024-04-01

## 2024-04-01 NOTE — PROGRESS NOTES
4/1/2024    Assessment:       Diagnosis Orders   1. Type 2 diabetes mellitus with other specified complication, without long-term current use of insulin ([facility])  POCT Glucose            PLAN:     Orders Placed This Encounter   Procedures    Basic Metabolic Panel     Standing Status:   Future     Standing Expiration Date:   4/1/2025    Hemoglobin A1C     Standing Status:   Future     Standing Expiration Date:   4/1/2025    Lipid Panel     Standing Status:   Future     Standing Expiration Date:   4/1/2025    POCT Glucose     Orders Placed This Encounter   Medications    Tirzepatide (MOUNJARO) 12.5 MG/0.5ML SOPN SC injection     Sig: Inject 0.5 mLs into the skin once a week     Dispense:  4 Adjustable Dose Pre-filled Pen Syringe     Refill:  3     Increase dose of Mounjaro to 12.5 mg patient to follow-up in 3 to 6 months    Orders Placed This Encounter   Procedures    POCT Glucose     No orders of the defined types were placed in this encounter.    No follow-ups on file.  Subjective:     Chief Complaint   Patient presents with    Diabetes     Vitals:    04/01/24 1621   BP: 114/75   Pulse: 74   SpO2: 98%   Weight: 96.2 kg (212 lb)   Height: 1.702 m (5' 7\")     Wt Readings from Last 3 Encounters:   04/01/24 96.2 kg (212 lb)   11/16/23 99.8 kg (220 lb)   07/05/18 125.4 kg (276 lb 6.4 oz)     BP Readings from Last 3 Encounters:   04/01/24 114/75   11/16/23 121/77   07/05/18 106/73     Follow-up on type 2 diabetes obesity patient is on Mounjaro 10 mg once a week has been losing weight  Hemoglobin A1C       Date                     Value               Ref Range           Status                03/12/2024               5.3                 4.8 - 5.9 %         Final            ----------  Hemoglobin A1c was 5.3 has had occasional hypoglycemia history of sleep apnea arthritis    Diabetes  She presents for her follow-up diabetic visit. She has type 2 diabetes mellitus. Risk factors for coronary artery disease include obesity. She is

## 2024-04-10 DIAGNOSIS — I10 BENIGN ESSENTIAL HYPERTENSION: ICD-10-CM

## 2024-04-10 DIAGNOSIS — M06.9 RHEUMATOID ARTHRITIS, INVOLVING UNSPECIFIED SITE, UNSPECIFIED WHETHER RHEUMATOID FACTOR PRESENT (MULTI): ICD-10-CM

## 2024-04-10 RX ORDER — IBUPROFEN 800 MG/1
800 TABLET ORAL EVERY 8 HOURS
Qty: 90 TABLET | Refills: 1 | Status: SHIPPED | OUTPATIENT
Start: 2024-04-10

## 2024-04-10 RX ORDER — LOSARTAN POTASSIUM 25 MG/1
25 TABLET ORAL DAILY
Qty: 90 TABLET | Refills: 3 | Status: SHIPPED | OUTPATIENT
Start: 2024-04-10 | End: 2025-04-10

## 2024-05-29 DIAGNOSIS — E78.5 HYPERLIPIDEMIA, UNSPECIFIED HYPERLIPIDEMIA TYPE: Primary | ICD-10-CM

## 2024-05-29 RX ORDER — ROSUVASTATIN CALCIUM 20 MG/1
20 TABLET, COATED ORAL DAILY
Qty: 90 TABLET | Refills: 3 | Status: SHIPPED | OUTPATIENT
Start: 2024-05-29 | End: 2025-05-29

## 2024-05-29 NOTE — TELEPHONE ENCOUNTER
Patient requests prescription below    Last Office Visit: 1/8/2024   Next Office Visit: Visit date not found     Requested Prescriptions     Pending Prescriptions Disp Refills    rosuvastatin (Crestor) 20 mg tablet 30 tablet 3     Sig: Take 1 tablet (20 mg) by mouth once daily.

## 2024-06-11 ENCOUNTER — TELEPHONE (OUTPATIENT)
Dept: PRIMARY CARE | Facility: CLINIC | Age: 49
End: 2024-06-11

## 2024-06-11 DIAGNOSIS — I10 BENIGN ESSENTIAL HYPERTENSION: Primary | ICD-10-CM

## 2024-06-11 RX ORDER — SPIRONOLACTONE 50 MG/1
TABLET, FILM COATED ORAL
COMMUNITY
Start: 2024-04-22

## 2024-06-16 DIAGNOSIS — M06.9 RHEUMATOID ARTHRITIS, INVOLVING UNSPECIFIED SITE, UNSPECIFIED WHETHER RHEUMATOID FACTOR PRESENT (MULTI): ICD-10-CM

## 2024-06-17 RX ORDER — IBUPROFEN 800 MG/1
800 TABLET ORAL EVERY 6 HOURS
Qty: 360 TABLET | Refills: 3 | Status: SHIPPED | OUTPATIENT
Start: 2024-06-17 | End: 2025-06-17

## 2024-06-17 NOTE — TELEPHONE ENCOUNTER
Last OV 1/8/2024  Requested Prescriptions     Pending Prescriptions Disp Refills    ibuprofen 800 mg tablet [Pharmacy Med Name: IBUPROFEN 800 MG TABLET] 360 tablet 3     Sig: Take 1 tablet (800 mg) by mouth every 6 hours.

## 2024-06-26 DIAGNOSIS — E11.69 TYPE 2 DIABETES MELLITUS WITH OTHER SPECIFIED COMPLICATION, WITHOUT LONG-TERM CURRENT USE OF INSULIN (HCC): ICD-10-CM

## 2024-06-26 LAB
ANION GAP SERPL CALCULATED.3IONS-SCNC: 10 MEQ/L (ref 9–15)
BUN SERPL-MCNC: 14 MG/DL (ref 6–20)
CALCIUM SERPL-MCNC: 9.5 MG/DL (ref 8.5–9.9)
CHLORIDE SERPL-SCNC: 106 MEQ/L (ref 95–107)
CHOLEST SERPL-MCNC: 134 MG/DL (ref 0–199)
CO2 SERPL-SCNC: 25 MEQ/L (ref 20–31)
CREAT SERPL-MCNC: 0.58 MG/DL (ref 0.5–0.9)
GLUCOSE SERPL-MCNC: 114 MG/DL (ref 70–99)
HDLC SERPL-MCNC: 38 MG/DL (ref 40–59)
LDLC SERPL CALC-MCNC: 63 MG/DL (ref 0–129)
POTASSIUM SERPL-SCNC: 4.2 MEQ/L (ref 3.4–4.9)
SODIUM SERPL-SCNC: 141 MEQ/L (ref 135–144)
TRIGL SERPL-MCNC: 167 MG/DL (ref 0–150)

## 2024-06-27 LAB
ESTIMATED AVERAGE GLUCOSE: 82 MG/DL
HBA1C MFR BLD: 4.5 % (ref 4–6)

## 2024-07-01 ENCOUNTER — OFFICE VISIT (OUTPATIENT)
Dept: ENDOCRINOLOGY | Age: 49
End: 2024-07-01
Payer: COMMERCIAL

## 2024-07-01 VITALS
HEART RATE: 69 BPM | OXYGEN SATURATION: 98 % | BODY MASS INDEX: 31.23 KG/M2 | WEIGHT: 199 LBS | HEIGHT: 67 IN | DIASTOLIC BLOOD PRESSURE: 72 MMHG | SYSTOLIC BLOOD PRESSURE: 111 MMHG

## 2024-07-01 DIAGNOSIS — E11.69 TYPE 2 DIABETES MELLITUS WITH OTHER SPECIFIED COMPLICATION, WITHOUT LONG-TERM CURRENT USE OF INSULIN (HCC): Primary | ICD-10-CM

## 2024-07-01 PROCEDURE — 3044F HG A1C LEVEL LT 7.0%: CPT | Performed by: INTERNAL MEDICINE

## 2024-07-01 PROCEDURE — 99213 OFFICE O/P EST LOW 20 MIN: CPT | Performed by: INTERNAL MEDICINE

## 2024-07-01 NOTE — PROGRESS NOTES
(premature ventricular contraction) 2017    cardiac ablation     Past Surgical History:   Procedure Laterality Date    ABLATION OF DYSRHYTHMIC F[place]US  [place]    CARPAL TUNNEL RELEASE Bilateral     COLONOSCOPY      ENDOSCOPY, COLON, DIAGNOSTIC      ME FASCIOTOMY FOOT&/TOE Left 2018    PLANTAR FASCIOTOMY LEFT VIA RADIOFREQUENCY COBLATION performed by Elizabeth Marc DPM at [facility] OR    TUBAL LIGATION  2002     Social History     Socioeconomic History    Marital status:      Spouse name: Not on file    Number of children: Not on file    Years of education: Not on file    Highest education level: Not on file   Occupational History    Not on file   Tobacco Use    Smoking status: Former     Current packs/day: 0.00     Average packs/day: 0.5 packs/day for 12.0 years (6.0 ttl pk-yrs)     Types: Cigarettes     Start date: 2002     Quit date: 2014     Years since quittin.9     Passive exposure: Past    Smokeless tobacco: Never   Vaping Use    Vaping Use: Never used   Substance and Sexual Activity    Alcohol use: Yes     Comment: social    Drug use: No    Sexual activity: Not on file   Other Topics Concern    Not on file   Social History Narrative    Not on file     Social Determinants of Health     Financial Resource Strain: Not on file   Food Insecurity: Not on file   Transportation Needs: Not on file   Physical Activity: Not on file   Stress: Not on file   Social Connections: Not on file   Intimate Partner Violence: Not on file   Housing Stability: Not on file     Family History   Problem Relation Age of Onset    No Known Problems Sister     No Known Problems Brother     No Known Problems Sister     Other Brother         hx drug abuse     Allergies   Allergen Reactions    Bee Venom Anaphylaxis    Tetracycline Anaphylaxis    Fentanyl Headaches and Other (See Comments)     headaches         Current Outpatient Medications:     Tirzepatide (MOUNJARO) 12.5 MG/0.5ML SOPN SC

## 2024-07-31 DIAGNOSIS — A04.8 BACTERIAL INFECTION DUE TO H. PYLORI: ICD-10-CM

## 2024-07-31 DIAGNOSIS — M06.9 RHEUMATOID ARTHRITIS, INVOLVING UNSPECIFIED SITE, UNSPECIFIED WHETHER RHEUMATOID FACTOR PRESENT (MULTI): ICD-10-CM

## 2024-07-31 RX ORDER — PANTOPRAZOLE SODIUM 40 MG/1
40 TABLET, DELAYED RELEASE ORAL 2 TIMES DAILY
Qty: 28 TABLET | Refills: 0 | Status: SHIPPED | OUTPATIENT
Start: 2024-07-31

## 2024-07-31 NOTE — TELEPHONE ENCOUNTER
Requested Prescriptions     Pending Prescriptions Disp Refills    pantoprazole (ProtoNix) 40 mg EC tablet 28 tablet 3     Sig: Take 1 tablet (40 mg) by mouth 2 times a day. Do not crush, chew, or split.

## 2024-08-01 RX ORDER — TIRZEPATIDE 12.5 MG/.5ML
INJECTION, SOLUTION SUBCUTANEOUS
Qty: 6 ADJUSTABLE DOSE PRE-FILLED PEN SYRINGE | Refills: 1 | Status: SHIPPED | OUTPATIENT
Start: 2024-08-01

## 2024-08-06 RX ORDER — TIRZEPATIDE 12.5 MG/.5ML
INJECTION, SOLUTION SUBCUTANEOUS
Qty: 4 ADJUSTABLE DOSE PRE-FILLED PEN SYRINGE | Refills: 1 | Status: SHIPPED | OUTPATIENT
Start: 2024-08-06

## 2024-09-30 ENCOUNTER — LAB (OUTPATIENT)
Dept: LAB | Facility: LAB | Age: 49
End: 2024-09-30
Payer: COMMERCIAL

## 2024-09-30 ENCOUNTER — APPOINTMENT (OUTPATIENT)
Dept: PRIMARY CARE | Facility: CLINIC | Age: 49
End: 2024-09-30
Payer: COMMERCIAL

## 2024-09-30 VITALS
WEIGHT: 197.4 LBS | SYSTOLIC BLOOD PRESSURE: 112 MMHG | RESPIRATION RATE: 18 BRPM | HEART RATE: 84 BPM | BODY MASS INDEX: 30.98 KG/M2 | HEIGHT: 67 IN | OXYGEN SATURATION: 98 % | DIASTOLIC BLOOD PRESSURE: 78 MMHG

## 2024-09-30 DIAGNOSIS — M54.12 CERVICAL RADICULOPATHY: ICD-10-CM

## 2024-09-30 DIAGNOSIS — M25.511 CHRONIC RIGHT SHOULDER PAIN: ICD-10-CM

## 2024-09-30 DIAGNOSIS — K21.9 GERD WITHOUT ESOPHAGITIS: ICD-10-CM

## 2024-09-30 DIAGNOSIS — Z13.6 SCREENING FOR HEART DISEASE: ICD-10-CM

## 2024-09-30 DIAGNOSIS — Z00.00 WELLNESS EXAMINATION: ICD-10-CM

## 2024-09-30 DIAGNOSIS — G89.29 CHRONIC RIGHT SHOULDER PAIN: ICD-10-CM

## 2024-09-30 DIAGNOSIS — Z13.21 ENCOUNTER FOR VITAMIN DEFICIENCY SCREENING: ICD-10-CM

## 2024-09-30 DIAGNOSIS — M54.41 CHRONIC BILATERAL LOW BACK PAIN WITH RIGHT-SIDED SCIATICA: ICD-10-CM

## 2024-09-30 DIAGNOSIS — G89.29 CHRONIC BILATERAL LOW BACK PAIN WITH RIGHT-SIDED SCIATICA: ICD-10-CM

## 2024-09-30 DIAGNOSIS — Z13.29 SCREENING FOR THYROID DISORDER: ICD-10-CM

## 2024-09-30 DIAGNOSIS — I10 BENIGN ESSENTIAL HYPERTENSION: ICD-10-CM

## 2024-09-30 DIAGNOSIS — R20.2 NUMBNESS AND TINGLING IN RIGHT HAND: ICD-10-CM

## 2024-09-30 DIAGNOSIS — R20.0 NUMBNESS AND TINGLING IN RIGHT HAND: ICD-10-CM

## 2024-09-30 DIAGNOSIS — E11.9 TYPE 2 DIABETES MELLITUS WITHOUT COMPLICATION, WITHOUT LONG-TERM CURRENT USE OF INSULIN (MULTI): Primary | ICD-10-CM

## 2024-09-30 DIAGNOSIS — E11.9 TYPE 2 DIABETES MELLITUS WITHOUT COMPLICATION, WITHOUT LONG-TERM CURRENT USE OF INSULIN (MULTI): ICD-10-CM

## 2024-09-30 DIAGNOSIS — M06.9 RHEUMATOID ARTHRITIS, INVOLVING UNSPECIFIED SITE, UNSPECIFIED WHETHER RHEUMATOID FACTOR PRESENT (MULTI): ICD-10-CM

## 2024-09-30 PROBLEM — M25.529 ELBOW PAIN: Status: RESOLVED | Noted: 2024-09-30 | Resolved: 2024-09-30

## 2024-09-30 PROBLEM — M77.8 TENDINITIS OF RIGHT ELBOW: Status: RESOLVED | Noted: 2024-09-30 | Resolved: 2024-09-30

## 2024-09-30 PROBLEM — N39.0 URINARY TRACT INFECTION: Status: RESOLVED | Noted: 2024-09-30 | Resolved: 2024-09-30

## 2024-09-30 PROBLEM — R40.0 DAYTIME SOMNOLENCE: Status: RESOLVED | Noted: 2024-09-30 | Resolved: 2024-09-30

## 2024-09-30 PROBLEM — M25.50 ARTHRALGIA: Status: RESOLVED | Noted: 2024-09-30 | Resolved: 2024-09-30

## 2024-09-30 PROBLEM — R73.9 HYPERGLYCEMIA: Status: RESOLVED | Noted: 2017-12-05 | Resolved: 2024-09-30

## 2024-09-30 PROBLEM — B37.31 CANDIDIASIS OF VAGINA: Status: RESOLVED | Noted: 2024-09-30 | Resolved: 2024-09-30

## 2024-09-30 PROBLEM — R06.02 SHORTNESS OF BREATH: Status: RESOLVED | Noted: 2024-09-30 | Resolved: 2024-09-30

## 2024-09-30 PROBLEM — R07.89 CHEST DISCOMFORT: Status: RESOLVED | Noted: 2024-09-30 | Resolved: 2024-09-30

## 2024-09-30 PROBLEM — R93.89 ABNORMAL COMPUTED TOMOGRAPHY SCAN: Status: RESOLVED | Noted: 2024-09-30 | Resolved: 2024-09-30

## 2024-09-30 LAB
25(OH)D3 SERPL-MCNC: 30 NG/ML (ref 30–100)
ALBUMIN SERPL BCP-MCNC: 5.1 G/DL (ref 3.4–5)
ALP SERPL-CCNC: 54 U/L (ref 33–110)
ALT SERPL W P-5'-P-CCNC: 20 U/L (ref 7–45)
ANION GAP SERPL CALC-SCNC: 12 MMOL/L (ref 10–20)
AST SERPL W P-5'-P-CCNC: 14 U/L (ref 9–39)
BILIRUB SERPL-MCNC: 0.5 MG/DL (ref 0–1.2)
BUN SERPL-MCNC: 18 MG/DL (ref 6–23)
CALCIUM SERPL-MCNC: 10.3 MG/DL (ref 8.6–10.3)
CHLORIDE SERPL-SCNC: 107 MMOL/L (ref 98–107)
CO2 SERPL-SCNC: 27 MMOL/L (ref 21–32)
CREAT SERPL-MCNC: 0.8 MG/DL (ref 0.5–1.05)
CREAT UR-MCNC: 157.8 MG/DL (ref 20–320)
EGFRCR SERPLBLD CKD-EPI 2021: 90 ML/MIN/1.73M*2
ERYTHROCYTE [DISTWIDTH] IN BLOOD BY AUTOMATED COUNT: 13.1 % (ref 11.5–14.5)
GLUCOSE SERPL-MCNC: 91 MG/DL (ref 74–99)
HCT VFR BLD AUTO: 40.9 % (ref 36–46)
HGB BLD-MCNC: 14.2 G/DL (ref 12–16)
MCH RBC QN AUTO: 31.6 PG (ref 26–34)
MCHC RBC AUTO-ENTMCNC: 34.7 G/DL (ref 32–36)
MCV RBC AUTO: 91 FL (ref 80–100)
MICROALBUMIN UR-MCNC: 13.9 MG/L
MICROALBUMIN/CREAT UR: 8.8 UG/MG CREAT
NRBC BLD-RTO: 0 /100 WBCS (ref 0–0)
PLATELET # BLD AUTO: 488 X10*3/UL (ref 150–450)
POTASSIUM SERPL-SCNC: 4.4 MMOL/L (ref 3.5–5.3)
PROT SERPL-MCNC: 7.7 G/DL (ref 6.4–8.2)
RBC # BLD AUTO: 4.49 X10*6/UL (ref 4–5.2)
SODIUM SERPL-SCNC: 142 MMOL/L (ref 136–145)
TSH SERPL-ACNC: 1.77 MIU/L (ref 0.44–3.98)
VIT B12 SERPL-MCNC: 616 PG/ML (ref 211–911)
WBC # BLD AUTO: 9.8 X10*3/UL (ref 4.4–11.3)

## 2024-09-30 PROCEDURE — 99396 PREV VISIT EST AGE 40-64: CPT | Performed by: NURSE PRACTITIONER

## 2024-09-30 PROCEDURE — 82306 VITAMIN D 25 HYDROXY: CPT

## 2024-09-30 PROCEDURE — 82570 ASSAY OF URINE CREATININE: CPT

## 2024-09-30 PROCEDURE — 82043 UR ALBUMIN QUANTITATIVE: CPT

## 2024-09-30 PROCEDURE — 84443 ASSAY THYROID STIM HORMONE: CPT

## 2024-09-30 PROCEDURE — 36415 COLL VENOUS BLD VENIPUNCTURE: CPT

## 2024-09-30 PROCEDURE — 85027 COMPLETE CBC AUTOMATED: CPT

## 2024-09-30 PROCEDURE — 80053 COMPREHEN METABOLIC PANEL: CPT

## 2024-09-30 PROCEDURE — 82607 VITAMIN B-12: CPT

## 2024-09-30 RX ORDER — CHLORHEXIDINE GLUCONATE ORAL RINSE 1.2 MG/ML
15 SOLUTION DENTAL 2 TIMES DAILY
COMMUNITY
Start: 2024-03-05 | End: 2024-09-30 | Stop reason: ALTCHOICE

## 2024-09-30 RX ORDER — PANTOPRAZOLE SODIUM 40 MG/1
40 TABLET, DELAYED RELEASE ORAL 2 TIMES DAILY
Qty: 60 TABLET | Refills: 11 | Status: SHIPPED | OUTPATIENT
Start: 2024-09-30 | End: 2025-09-30

## 2024-09-30 RX ORDER — TRETINOIN 0.25 MG/G
CREAM TOPICAL NIGHTLY
COMMUNITY
Start: 2024-08-14

## 2024-09-30 RX ORDER — TRIAZOLAM 0.25 MG/1
0.25 TABLET ORAL
COMMUNITY
Start: 2024-03-05 | End: 2024-09-30 | Stop reason: ALTCHOICE

## 2024-09-30 ASSESSMENT — ENCOUNTER SYMPTOMS
CONSTIPATION: 0
WEAKNESS: 0
FATIGUE: 0
DIARRHEA: 0
ARTHRALGIAS: 1
HEADACHES: 0
PALPITATIONS: 0
ABDOMINAL PAIN: 0
COUGH: 0
BACK PAIN: 1
FEVER: 0
SHORTNESS OF BREATH: 0

## 2024-09-30 ASSESSMENT — PATIENT HEALTH QUESTIONNAIRE - PHQ9
1. LITTLE INTEREST OR PLEASURE IN DOING THINGS: NOT AT ALL
2. FEELING DOWN, DEPRESSED OR HOPELESS: NOT AT ALL
SUM OF ALL RESPONSES TO PHQ9 QUESTIONS 1 AND 2: 0

## 2024-09-30 NOTE — PROGRESS NOTES
"Subjective   Lashell Alcazar is a 49 y.o. female who presents for Annual Exam (49 Year Annual Exam. ).  HPI    Patient reported health Fair  Regular Dental Visits yes  Regular Eye Visits yes  Hearing Loss no  Balanced Diet yes  Weight Concerns no  Exercise Regular    Review of Systems   Constitutional:  Negative for fatigue and fever.   Respiratory:  Negative for cough and shortness of breath.    Cardiovascular:  Negative for chest pain and palpitations.   Gastrointestinal:  Negative for abdominal pain, constipation and diarrhea.   Musculoskeletal:  Positive for arthralgias (right shoulder, chronic) and back pain (lumbar spine).   Neurological:  Negative for weakness and headaches.     Objective   Physical Exam  Vitals reviewed.   Constitutional:       Appearance: Normal appearance.   HENT:      Head: Normocephalic.   Eyes:      Conjunctiva/sclera: Conjunctivae normal.   Cardiovascular:      Rate and Rhythm: Normal rate and regular rhythm.      Pulses: Normal pulses.   Pulmonary:      Breath sounds: Normal breath sounds.   Abdominal:      General: Bowel sounds are normal.      Palpations: Abdomen is soft.   Musculoskeletal:      Cervical back: Neck supple.   Skin:     General: Skin is warm and dry.   Neurological:      General: No focal deficit present.      Mental Status: She is alert and oriented to person, place, and time.   Psychiatric:         Mood and Affect: Mood normal.         Thought Content: Thought content normal.       /78   Pulse 84   Resp 18   Ht 1.702 m (5' 7\")   Wt 89.5 kg (197 lb 6.4 oz)   SpO2 98%   BMI 30.92 kg/m²   Assessment/Plan   Problem List Items Addressed This Visit       Rheumatoid arthritis (Multi)    Cervical radiculopathy    Relevant Orders    MR cervical spine wo IV contrast     Other Visit Diagnoses       Type 2 diabetes mellitus without complication, without long-term current use of insulin (Multi)    -  Primary    Relevant Orders    CBC    Albumin-Creatinine Ratio, Urine " Random    CT cardiac scoring wo IV contrast    Screening for heart disease        Relevant Orders    CT cardiac scoring wo IV contrast    Bacterial infection due to H. pylori        Wellness examination        Relevant Orders    Comprehensive Metabolic Panel    GERD without esophagitis        Relevant Medications    pantoprazole (ProtoNix) 40 mg EC tablet    Screening for thyroid disorder        Relevant Orders    TSH with reflex to Free T4 if abnormal    Encounter for vitamin deficiency screening        Relevant Orders    Vitamin D 25-Hydroxy,Total (for eval of Vitamin D levels)    Vitamin B12    Chronic right shoulder pain        Relevant Orders    MR shoulder right w and wo IV contrast    Numbness and tingling in right hand        Relevant Orders    MR shoulder right w and wo IV contrast    Chronic bilateral low back pain with right-sided sciatica        Relevant Orders    MR lumbar spine wo IV contrast

## 2024-10-04 ENCOUNTER — TELEPHONE (OUTPATIENT)
Dept: PRIMARY CARE | Facility: CLINIC | Age: 49
End: 2024-10-04
Payer: COMMERCIAL

## 2024-10-04 NOTE — TELEPHONE ENCOUNTER
Rhonda in Radiology  states PT needs to have Xrays of Cervical Spine, Lumbar Spine and the Shoulder before MRI can be done. Auth needed.    If you have questions she can be reached at     Thank you

## 2024-10-07 DIAGNOSIS — M54.50 ACUTE LOW BACK PAIN, UNSPECIFIED BACK PAIN LATERALITY, UNSPECIFIED WHETHER SCIATICA PRESENT: ICD-10-CM

## 2024-10-07 DIAGNOSIS — R20.0 NUMBNESS AND TINGLING IN RIGHT HAND: ICD-10-CM

## 2024-10-07 DIAGNOSIS — G89.29 CHRONIC BILATERAL LOW BACK PAIN WITH RIGHT-SIDED SCIATICA: ICD-10-CM

## 2024-10-07 DIAGNOSIS — R20.2 NUMBNESS AND TINGLING IN RIGHT HAND: ICD-10-CM

## 2024-10-07 DIAGNOSIS — M54.41 CHRONIC BILATERAL LOW BACK PAIN WITH RIGHT-SIDED SCIATICA: ICD-10-CM

## 2024-10-07 DIAGNOSIS — G89.29 CHRONIC RIGHT SHOULDER PAIN: ICD-10-CM

## 2024-10-07 DIAGNOSIS — M25.511 CHRONIC RIGHT SHOULDER PAIN: ICD-10-CM

## 2024-10-07 DIAGNOSIS — M54.12 CERVICAL RADICULOPATHY: Primary | ICD-10-CM

## 2024-10-14 ENCOUNTER — OFFICE VISIT (OUTPATIENT)
Dept: ENDOCRINOLOGY | Age: 49
End: 2024-10-14
Payer: COMMERCIAL

## 2024-10-14 VITALS
OXYGEN SATURATION: 98 % | SYSTOLIC BLOOD PRESSURE: 116 MMHG | DIASTOLIC BLOOD PRESSURE: 74 MMHG | WEIGHT: 199 LBS | BODY MASS INDEX: 31.23 KG/M2 | HEIGHT: 67 IN | HEART RATE: 77 BPM

## 2024-10-14 DIAGNOSIS — E11.69 TYPE 2 DIABETES MELLITUS WITH OTHER SPECIFIED COMPLICATION, WITHOUT LONG-TERM CURRENT USE OF INSULIN (HCC): Primary | ICD-10-CM

## 2024-10-14 LAB
CHP ED QC CHECK: NORMAL
GLUCOSE BLD-MCNC: 112 MG/DL
HBA1C MFR BLD: 4.6 %

## 2024-10-14 PROCEDURE — 82962 GLUCOSE BLOOD TEST: CPT | Performed by: INTERNAL MEDICINE

## 2024-10-14 PROCEDURE — 3044F HG A1C LEVEL LT 7.0%: CPT | Performed by: INTERNAL MEDICINE

## 2024-10-14 PROCEDURE — 83036 HEMOGLOBIN GLYCOSYLATED A1C: CPT | Performed by: INTERNAL MEDICINE

## 2024-10-14 PROCEDURE — 99213 OFFICE O/P EST LOW 20 MIN: CPT | Performed by: INTERNAL MEDICINE

## 2024-10-14 RX ORDER — TIRZEPATIDE 12.5 MG/.5ML
INJECTION, SOLUTION SUBCUTANEOUS
Qty: 4 ADJUSTABLE DOSE PRE-FILLED PEN SYRINGE | Refills: 1 | Status: SHIPPED | OUTPATIENT
Start: 2024-10-14

## 2024-10-14 NOTE — PROGRESS NOTES
Negative.    Endocrine: Negative.    All other systems reviewed and are negative.      Objective:   Physical Exam  Vitals reviewed.   Constitutional:       General: She is not in acute distress.     Appearance: Normal appearance. She is obese.   HENT:      Head: Normocephalic and atraumatic.      Right Ear: External ear normal.      Left Ear: External ear normal.      Nose: Nose normal.   Eyes:      General: No scleral icterus.        Right eye: No discharge.         Left eye: No discharge.      Extraocular Movements: Extraocular movements intact.      Conjunctiva/sclera: Conjunctivae normal.   Cardiovascular:      Rate and Rhythm: Normal rate.   Pulmonary:      Effort: Pulmonary effort is normal.   Musculoskeletal:         General: Normal range of motion.      Cervical back: Normal range of motion and neck supple.   Neurological:      General: No focal deficit present.      Mental Status: She is alert and oriented to person, place, and time.   Psychiatric:         Mood and Affect: Mood normal.         Behavior: Behavior normal.

## 2024-10-28 ENCOUNTER — APPOINTMENT (OUTPATIENT)
Dept: RADIOLOGY | Facility: HOSPITAL | Age: 49
End: 2024-10-28
Payer: COMMERCIAL

## 2024-10-28 ENCOUNTER — HOSPITAL ENCOUNTER (OUTPATIENT)
Dept: RADIOLOGY | Facility: HOSPITAL | Age: 49
Discharge: HOME | End: 2024-10-28
Payer: COMMERCIAL

## 2024-10-28 DIAGNOSIS — M54.41 LUMBAGO WITH SCIATICA, RIGHT SIDE: ICD-10-CM

## 2024-10-28 DIAGNOSIS — R20.2 NUMBNESS AND TINGLING IN RIGHT HAND: ICD-10-CM

## 2024-10-28 DIAGNOSIS — M25.511 CHRONIC RIGHT SHOULDER PAIN: ICD-10-CM

## 2024-10-28 DIAGNOSIS — E11.9 TYPE 2 DIABETES MELLITUS WITHOUT COMPLICATION, WITHOUT LONG-TERM CURRENT USE OF INSULIN (MULTI): ICD-10-CM

## 2024-10-28 DIAGNOSIS — G89.29 CHRONIC RIGHT SHOULDER PAIN: ICD-10-CM

## 2024-10-28 DIAGNOSIS — Z13.6 SCREENING FOR HEART DISEASE: ICD-10-CM

## 2024-10-28 DIAGNOSIS — M54.12 RADICULOPATHY, CERVICAL REGION: ICD-10-CM

## 2024-10-28 DIAGNOSIS — G89.29 OTHER CHRONIC PAIN: ICD-10-CM

## 2024-10-28 DIAGNOSIS — R20.0 NUMBNESS AND TINGLING IN RIGHT HAND: ICD-10-CM

## 2024-10-28 PROCEDURE — 73221 MRI JOINT UPR EXTREM W/O DYE: CPT | Mod: RT

## 2024-10-28 PROCEDURE — 75571 CT HRT W/O DYE W/CA TEST: CPT

## 2024-10-28 PROCEDURE — 72148 MRI LUMBAR SPINE W/O DYE: CPT | Performed by: RADIOLOGY

## 2024-10-28 PROCEDURE — 72148 MRI LUMBAR SPINE W/O DYE: CPT

## 2024-10-28 PROCEDURE — 72141 MRI NECK SPINE W/O DYE: CPT

## 2024-10-28 PROCEDURE — 72141 MRI NECK SPINE W/O DYE: CPT | Performed by: RADIOLOGY

## 2024-10-28 PROCEDURE — 73221 MRI JOINT UPR EXTREM W/O DYE: CPT | Mod: RIGHT SIDE | Performed by: RADIOLOGY

## 2024-11-06 DIAGNOSIS — M75.101 TEAR OF RIGHT SUPRASPINATUS TENDON: Primary | ICD-10-CM

## 2024-11-06 DIAGNOSIS — S43.431A TEAR OF RIGHT GLENOID LABRUM, INITIAL ENCOUNTER: ICD-10-CM

## 2024-11-06 DIAGNOSIS — F33.1 MODERATE EPISODE OF RECURRENT MAJOR DEPRESSIVE DISORDER: ICD-10-CM

## 2024-11-06 DIAGNOSIS — J40 BRONCHITIS: ICD-10-CM

## 2024-11-06 DIAGNOSIS — M67.813 BICEPS TENDONOSIS OF RIGHT SHOULDER: ICD-10-CM

## 2024-11-06 DIAGNOSIS — M19.011 OSTEOARTHRITIS OF RIGHT ACROMIOCLAVICULAR JOINT: ICD-10-CM

## 2024-11-06 RX ORDER — PREDNISONE 50 MG/1
50 TABLET ORAL DAILY
Qty: 5 TABLET | Refills: 0 | Status: SHIPPED | OUTPATIENT
Start: 2024-11-06 | End: 2024-11-11

## 2024-11-11 ENCOUNTER — E-VISIT (OUTPATIENT)
Dept: PRIMARY CARE | Facility: CLINIC | Age: 49
End: 2024-11-11
Payer: COMMERCIAL

## 2024-11-11 DIAGNOSIS — M19.011 OSTEOARTHRITIS OF RIGHT ACROMIOCLAVICULAR JOINT: ICD-10-CM

## 2024-11-11 DIAGNOSIS — F33.1 MODERATE EPISODE OF RECURRENT MAJOR DEPRESSIVE DISORDER: Primary | ICD-10-CM

## 2024-11-11 RX ORDER — BUPROPION HYDROCHLORIDE 100 MG/1
300 TABLET ORAL DAILY
Qty: 90 TABLET | Refills: 0 | Status: CANCELLED | OUTPATIENT
Start: 2024-11-11

## 2024-11-11 RX ORDER — TIRZEPATIDE 12.5 MG/.5ML
12.5 INJECTION, SOLUTION SUBCUTANEOUS WEEKLY
COMMUNITY
Start: 2024-11-04

## 2024-11-11 RX ORDER — BUPROPION HYDROCHLORIDE 300 MG/1
300 TABLET ORAL EVERY MORNING
Qty: 30 TABLET | Refills: 11 | Status: SHIPPED | OUTPATIENT
Start: 2024-11-11 | End: 2025-11-11

## 2024-11-11 NOTE — TELEPHONE ENCOUNTER
Patient requests prescription below    Last Office Visit: 9/30/2024   Next Office Visit: Visit date not found     Requested Prescriptions     Pending Prescriptions Disp Refills    buPROPion (Wellbutrin) 100 mg tablet 90 tablet 0     Sig: Take 3 tablets (300 mg) by mouth early in the morning..

## 2024-12-02 ENCOUNTER — APPOINTMENT (OUTPATIENT)
Dept: PHYSICAL THERAPY | Facility: CLINIC | Age: 49
End: 2024-12-02
Payer: COMMERCIAL

## 2024-12-02 DIAGNOSIS — M19.011 OSTEOARTHRITIS OF RIGHT ACROMIOCLAVICULAR JOINT: ICD-10-CM

## 2024-12-02 DIAGNOSIS — M75.101 TEAR OF RIGHT SUPRASPINATUS TENDON: Primary | ICD-10-CM

## 2024-12-02 DIAGNOSIS — M67.813 BICEPS TENDONOSIS OF RIGHT SHOULDER: ICD-10-CM

## 2024-12-02 DIAGNOSIS — S43.431A TEAR OF RIGHT GLENOID LABRUM, INITIAL ENCOUNTER: ICD-10-CM

## 2024-12-06 ENCOUNTER — APPOINTMENT (OUTPATIENT)
Dept: OBSTETRICS AND GYNECOLOGY | Facility: CLINIC | Age: 49
End: 2024-12-06
Payer: COMMERCIAL

## 2024-12-16 ENCOUNTER — APPOINTMENT (OUTPATIENT)
Dept: ORTHOPEDIC SURGERY | Facility: CLINIC | Age: 49
End: 2024-12-16
Payer: COMMERCIAL

## 2024-12-16 ENCOUNTER — APPOINTMENT (OUTPATIENT)
Dept: PHYSICAL THERAPY | Facility: CLINIC | Age: 49
End: 2024-12-16
Payer: COMMERCIAL

## 2024-12-16 VITALS — HEIGHT: 68 IN | BODY MASS INDEX: 29.55 KG/M2 | WEIGHT: 195 LBS

## 2024-12-16 DIAGNOSIS — M75.101 TEAR OF RIGHT ROTATOR CUFF, UNSPECIFIED TEAR EXTENT, UNSPECIFIED WHETHER TRAUMATIC: Primary | ICD-10-CM

## 2024-12-16 PROCEDURE — 4010F ACE/ARB THERAPY RXD/TAKEN: CPT | Performed by: ORTHOPAEDIC SURGERY

## 2024-12-16 PROCEDURE — 3061F NEG MICROALBUMINURIA REV: CPT | Performed by: ORTHOPAEDIC SURGERY

## 2024-12-16 PROCEDURE — 99203 OFFICE O/P NEW LOW 30 MIN: CPT | Performed by: ORTHOPAEDIC SURGERY

## 2024-12-16 PROCEDURE — 3008F BODY MASS INDEX DOCD: CPT | Performed by: ORTHOPAEDIC SURGERY

## 2024-12-16 PROCEDURE — 20610 DRAIN/INJ JOINT/BURSA W/O US: CPT | Performed by: ORTHOPAEDIC SURGERY

## 2024-12-16 RX ORDER — LIDOCAINE HYDROCHLORIDE 10 MG/ML
5 INJECTION, SOLUTION INFILTRATION; PERINEURAL
Status: COMPLETED | OUTPATIENT
Start: 2024-12-16 | End: 2024-12-16

## 2024-12-16 RX ORDER — BETAMETHASONE SODIUM PHOSPHATE AND BETAMETHASONE ACETATE 3; 3 MG/ML; MG/ML
2 INJECTION, SUSPENSION INTRA-ARTICULAR; INTRALESIONAL; INTRAMUSCULAR; SOFT TISSUE
Status: COMPLETED | OUTPATIENT
Start: 2024-12-16 | End: 2024-12-16

## 2024-12-16 NOTE — PROGRESS NOTES
History of present: Right shoulder pain [facility] she is got a small but full-thickness rotator cuff tear        Past medical history:    The patient's past medical history, family history, social history and review of systems were documented on the patient's medical intake form.  The medical intake form was reviewed and scanned into the electronic medical record for future use.  History is otherwise negative except as stated in the history of present illness.    Physical exam:    General: Alert and oriented to person place and time.  No acute distress and breathing comfortably, pleasant and cooperative with examination.    Head and neck exam: Head is normocephalic atraumatic.    Neck: Supple no visible swelling or deformity.    Cardiovascular: Good perfusion to affected extremities without signs or symptoms of chest pain.    Lungs no audible wheezing or labored breathing on examination.    Abdominal exam: Nondistended nontender    Extremities: The right shoulder was inspected and was found to have no obvious deformity.  There was tenderness to touch over the lateral edges of the shoulder over the rotator cuff insertion.  Active forward flexion 120 degrees, external rotation to 60 degrees, abduction to 50 degrees, and internal rotation to the level of L2.    At this time the shoulder is neurovascular tact and neurosensory intact.  Motor intact C5-T1.  There was no obvious neck pain or radiculopathy noted.  There was no gross instability or adhesive capsulitis symptoms.  There was no evidence of apprehension or apprehension suppression.    Strength was tested in 4 planes with weakness in the supraspinatus strength testing and external rotation position.  There was no strength deficit in internal rotation.  Impingement signs were positive both supine and standing for impingement test type I and II.  There was mild pain over the bicipital groove with a positive speeds sign    Before  aspiration injection the risks of a cortisone injection including infection, local skin irritation, skin atrophy, calcification, continued pain and discomfort, elevated blood sugar, burning, failure to relieve pain, possible late infection were discussed with the patient.    Postprocedure discomfort can be alleviated with additional medications, ice, elevation, rest over the first 24 hours as recommended.    Patient verbalized understanding and wanted to proceed with the planned procedure.    After informed consent was provided and allergies verified, the patient was positioned appropriately on thel bed.  The right shoulder to be aspirated and injected was prepped and draped in a sterile fashion.  The skin was anesthetized with ethyl chloride spray.  A joint aspiration was to be performed an 18-gauge needle was used otherwise a 22-gauge needle was used to inject the appropriate joint.    Joint injection was performed with a mixture of 5 cc 1% lidocaine plain and 2 cc Celestone Soluspan 6 mg per mL.  The needle was removed and the puncture site closed and sealed with a Band-Aid.  The patient tolerated the procedure well.            Diagnostic studies[facility] 3 mm full-thickness rotator cuff tear a little bit of degeneration of the labrum and a little bit of impinging spur from the AC joint right      Impression: Right shoulder full-thickness rotator cuff tear      Plan: Cortisone shot PT therapy rehab program that will help with the night pain I will see her back in 6 weeks if she has not improved she will need an AP axillary x-ray to evaluate the AC joint shape arch and spur and possible sign her up for arthroscopy decompression and rotator cuff repair will see how she progresses with therapy    L Inj/Asp: R subacromial bursa on 12/16/2024 3:54 PM  Indications: pain  Details: 22 G needle, medial approach  Medications: 2 mL betamethasone acet,sod phos 6 mg/mL; 5 mL lidocaine 10 mg/mL (1  %)  Outcome: tolerated well, no immediate complications  Procedure, treatment alternatives, risks and benefits explained, specific risks discussed. Consent was given by the patient. Immediately prior to procedure a time out was called to verify the correct patient, procedure, equipment, support staff and site/side marked as required.

## 2024-12-17 DIAGNOSIS — L70.9 ACNE, UNSPECIFIED ACNE TYPE: Primary | ICD-10-CM

## 2024-12-17 RX ORDER — TRETINOIN 0.5 MG/G
CREAM TOPICAL NIGHTLY
Qty: 20 G | Refills: 11 | Status: SHIPPED | OUTPATIENT
Start: 2024-12-17 | End: 2025-12-17

## 2024-12-21 DIAGNOSIS — E11.69 TYPE 2 DIABETES MELLITUS WITH OTHER SPECIFIED COMPLICATION, WITHOUT LONG-TERM CURRENT USE OF INSULIN (HCC): Primary | ICD-10-CM

## 2024-12-23 ENCOUNTER — EVALUATION (OUTPATIENT)
Dept: PHYSICAL THERAPY | Facility: CLINIC | Age: 49
End: 2024-12-23
Payer: COMMERCIAL

## 2024-12-23 DIAGNOSIS — M75.101 TEAR OF RIGHT SUPRASPINATUS TENDON: Primary | ICD-10-CM

## 2024-12-23 DIAGNOSIS — S43.431D TEAR OF RIGHT GLENOID LABRUM, SUBSEQUENT ENCOUNTER: ICD-10-CM

## 2024-12-23 DIAGNOSIS — M67.813 BICEPS TENDONOSIS OF RIGHT SHOULDER: ICD-10-CM

## 2024-12-23 DIAGNOSIS — M19.011 OSTEOARTHRITIS OF RIGHT ACROMIOCLAVICULAR JOINT: ICD-10-CM

## 2024-12-23 PROCEDURE — 97161 PT EVAL LOW COMPLEX 20 MIN: CPT | Mod: GP

## 2024-12-23 PROCEDURE — 97110 THERAPEUTIC EXERCISES: CPT | Mod: GP

## 2024-12-23 RX ORDER — TIRZEPATIDE 12.5 MG/.5ML
INJECTION, SOLUTION SUBCUTANEOUS
Qty: 6 ML | Refills: 1 | Status: SHIPPED | OUTPATIENT
Start: 2024-12-23

## 2024-12-23 ASSESSMENT — ENCOUNTER SYMPTOMS
LOSS OF SENSATION IN FEET: 0
DEPRESSION: 0
OCCASIONAL FEELINGS OF UNSTEADINESS: 0

## 2024-12-23 ASSESSMENT — PAIN SCALES - GENERAL: PAINLEVEL_OUTOF10: 4

## 2024-12-23 ASSESSMENT — PAIN - FUNCTIONAL ASSESSMENT: PAIN_FUNCTIONAL_ASSESSMENT: 0-10

## 2024-12-23 NOTE — PROGRESS NOTES
"Physical Therapy Evaluation    Patient Name: Lashell Alcazar  MRN: [id number]  Time Calculation  Start Time: 0525  Stop Time: 0600  Time Calculation (min): 35 min  PT Evaluation Time Entry  PT Evaluation (Low) Time Entry: 15  PT Therapeutic Procedures Time Entry  Therapeutic Exercise Time Entry: 20                   Current Problem  1. Tear of right supraspinatus tendon  Follow Up In Physical Therapy      2. Tear of right glenoid labrum, subsequent encounter  Follow Up In Physical Therapy      3. Biceps tendonosis of right shoulder  Follow Up In Physical Therapy      4. Osteoarthritis of right acromioclavicular joint  Follow Up In Physical Therapy        Insurance    Insurance reviewed   Visit number: 1  [facility] BMN PT OT COPAY 40 ([id number]),COVERAGE 65 ([id number]) NO AUTH REQ       Subjective   General:  Patient is a 48 y/o F who is here today for c/o R shoulder pain for a couple years, denies specific onset/injury, but reports that it has been getting progressively worse over time, she feels that it is due to repetitive motions. Patient did have cortisone injection on 12/16/24, which has given her some relief, as she is not being woken up by her pain. Patient is getting some numbness/tingling and pain into her RUE at times. Denies headaches, neck pain, changes in vision, dizziness/lightheadedness, tinnitus.    PMHx significant of; diabetes, OA, RA    Pt goal for PT: \"range of motion, pain relief\"  Precautions:  None   Pain:  Current: 4/10, R shoulder, aching and sharp  Worst: 6/10  Best: 4/10  Pain Exacerbating Factors: lifting, carrying, reaching, driving, sleeping, ADLs/IADLs, work duties  Pain Relieving Factors: cortisone injections, OTC pain relievers, lidocaine patches    Reviewed medical screening form with pt and medical screening assessed    Imaging:   [facility] 3 mm full-thickness rotator cuff tear a little bit of degeneration of the labrum and a little bit of impinging spur " from the AC joint right     Objective   Shoulder Musculoskeletal Exam    Palpation    Right      Tenderness: present        Anterior shoulder: mild        Posterior shoulder: mild        AC joint: mild        Rotator cuff: mild    Range of Motion    Right      Active ROM: abnormal and pain.       Passive ROM: abnormal and pain.       Active forward elevation: 120.       Passive forward elevation: 150.       Shoulder active abduction: 110.       Passive abduction: 140.       Active external rotation at side: 40.       Passive external rotation at side: 50.       Passive internal rotation in abduction: 50.       Internal rotation: T11.     Left      Active ROM: normal and no pain.     Strength    Right      External rotation: 4/5. External rotation is affected by pain.       Internal rotation: 4/5. Internal rotation is affected by pain.       Abduction: 4/5. Abduction is affected by pain.       Biceps: 5/5.       Triceps: 5/5.     Left      External rotation: 5/5.       Internal rotation: 5/5.       Abduction: 5/5.       Biceps: 5/5.       Triceps: 5/5.     Special Tests    Right    Rotator Cuff Signs      Neer's test: positive       Beltre test: positive       Painful arc test: positive       Lift-off sign: positive       Drop arm test: positive     Biceps/sid Signs      Speed's test: positive        Outcome Measures:  Other Measures  Disability of Arm Shoulder Hand (DASH): 34     Treatment: see HEP below    EDUCATION/HEP:  Access Code: [email or web address]  URL: [email or web address]  Date: 12/23/2024  Prepared by: Nakia Rebollar    Exercises  - Supine Shoulder Flexion Extension AAROM with Dowel  - 1 x daily - 7 x weekly - 2 sets - 10 reps  - Supine Shoulder External Rotation with Dowel  - 1 x daily - 7 x weekly - 2 sets - 10 reps  - Standing Shoulder Abduction AAROM with Dowel  - 1 x daily - 7 x weekly - 2 sets - 10 reps  - Standing Bilateral Shoulder Internal Rotation AAROM with Dowel  - 1 x daily  - 7 x weekly - 2 sets - 10 reps  - Standing Shoulder Extension with Dowel  - 1 x daily - 7 x weekly - 2 sets - 10 reps  - Sidelying Shoulder Abduction  - 1 x daily - 7 x weekly - 1 sets - 10 reps  - Sidelying Shoulder Flexion 15 Degrees  - 1 x daily - 7 x weekly - 1 sets - 10 reps  - Sidelying Shoulder Horizontal Abduction  - 1 x daily - 7 x weekly - 1 sets - 10 reps  - Sidelying Shoulder External Rotation  - 1 x daily - 7 x weekly - 1 sets - 10 reps    Assessment:  Patient is a 50 y/o F who participated in PT evaluation this date for c/o R shoulder pain d/t R rotator cuff tear. Patient presents with R shoulder pain, decreased R shoulder A/PROM, decreased RUE strength, and impaired posture. Due to these impairments, pt has increased difficulty with lifting, carrying, reaching, driving, sleeping, performing work duties and performing ADLs/IADLs. Pt would benefit from PT services to decrease pain, improve ROM, improve strength, and posture to facilitate return to prior level of activities as able.    Problem List: activity limitations, ADLs/IADLs/self care skills, decreased functional level, decreased knowledge of HEP, decreased knowledge of precautions, flexibility, pain, participation restrictions, posture, range of motion/joint mobility and strength.     Clinical Presentation: Stable    Level of Complexity: Low     Goals:  Pt will report at least 75% improvement in R shoulder pain during everyday activities.  Pt will demo >/= 4+/5 strength of Rshoulder musculature without pain.  Pt will demo full and symmetrical A/PROM of R shoulder without pain.  Pt will improve Quick DASH score by at least 20% (MCID) to indicate a significant improvement in overall function.   Pt will demo independence and report compliance with HEP.     Plan  1x/week for 8 visits     Skilled therapeutic intervention to address the above mentioned physical and functional impairments and limitations including, but not limited to: patient  education, therapeutic exercise, therapeutic activity, manual therapy, body mechanics training, dry needling, blood flow restriction training, instrumented soft tissue mobilization, manual soft tissue mobilization, gait retraining, biofeedback, cryotherapy, electrical stimulation, home program development, hot pack, taping, neuromuscular re-education, self-care/home management, and vasopneumatic compression.

## 2024-12-27 DIAGNOSIS — I10 BENIGN ESSENTIAL HYPERTENSION: ICD-10-CM

## 2024-12-30 RX ORDER — LOSARTAN POTASSIUM 25 MG/1
25 TABLET ORAL DAILY
Qty: 30 TABLET | Refills: 0 | Status: SHIPPED | OUTPATIENT
Start: 2024-12-30

## 2024-12-30 NOTE — TELEPHONE ENCOUNTER
Pharmacy requests prescription below    Last Office Visit: 9/30/2024   Next Office Visit: Visit date not found     Requested Prescriptions     Pending Prescriptions Disp Refills    losartan (Cozaar) 25 mg tablet [Pharmacy Med Name: LOSARTAN POTASSIUM 25 MG TAB] 30 tablet 0     Sig: TAKE 1 TABLET BY MOUTH EVERY DAY

## 2025-01-03 NOTE — PROGRESS NOTES
New pt here with prolapse and urinary concerns. Pt unable to void   PVR = 10ml     Chaperone declined: Elise Nicole, CM3      Referred by: self     PCP  FELIX Diaz-CNP         CHIEF COMPLAINT:  rectocele         HISTORY OF PRESENT ILLNESS:  This is a  49 y.o. y.o. female who presents with rectocele on imaging. In showering, felt protrusion from the vagina, pushed it back in. Sometimes feels it going about her day. Hard to pass BM. Started doing kegels a few months ago and hasn't had it for a couple months.     First noticed a year ago. Bladder leakage for years. Now with frequency.     The following were reviewed to gain additional history:  External notes: GI 11/7/23, CT A/P showed rectocele  Test results:   CT A/P 10/23/23  IMPRESSION:  Suspect uncomplicated minimal rectocele, not significantly changed  since 12/11/2027. No other acute abdominopelvic process.      Small bowel intussusception without bowel obstruction, likely  transient.  Specifically, she describes the following pelvic floor symptoms:          Prolapse: Yes       - Splinting to urinate: No       - Splinting for bowel movement/stool trapping: No              Incontinence:  Yes             Mixed, U>S              Urinary Symptoms:       - Frequency:  Yes             # Voids:         - Nocturia: No             # Voids:  occasionally 5-6 times per night       - Urgency:  Yes       - Incomplete emptying:  No       - Hesitancy:  Yes -        - Pain with voiding:  Yes - sometimes has pain that shoots up, cramping-like pain if she waits too long       - Excessive fluid intake: No, one cup of coffee then water               History:       - Recurrent UTI:  No       - Hematuria:  No       - Stones:  No       - Kidney Disease:  No                  Bowel Symptoms:       - Regular: No, once per week, very hard stool       - Diarrhea:No       - Constipation: Yes, tried adding metamucil       - Fecal Incontinence:  No       - Flatus Incontinence:   "No       - Fecal urgency:   No    Past medical and surgical hx reviewed - pertinent for   PMH anal fissures  PSH tubal ligation  Smoking history: former, 27 pack year history        Gyn History:  - Postmenopause: No           Postmenopausal bleeding: no, mirena in place  - HRT: No  - Pap up to date: No  - Sexually active:  No  - Number of prior vaginal deliveries: 2   Number of prior operative deliveries: FAVD x 1   Prior OASI? Possibly with son  - Number of prior c-sections: 0    - Mammogram up to date:  2022  - Colonoscopy up to date: Yes    OB History    No obstetric history on file.               PHYSICAL EXAMINATION:  No LMP recorded. (Menstrual status: IUD).  Body mass index is 29.95 kg/m².  /72   Ht 1.727 m (5' 8\")   Wt 89.4 kg (197 lb)   BMI 29.95 kg/m²   General Appearance: well appearing  Neuro: Alert and oriented   HEENT: mucous membranes moist, neck supple  Resp: No respiratory distress, normal work of breathing  MSK: normal range of motion, gait appropriate    Pelvic:  Genitourinary: normal external genitalia, Bartholin's glands negative, Assumption's glands negative  Urethra: normal meatus, non-tender, no periurethral mass  Vaginal mucosa  normal  Cervix  normal  Uterus  normal size, non-tender, mobile  Adnexae  negative nontender, no masses  Atrophy negative    CST negative    POP-Q (in supine position):       Aa -1     Ba -1     C -6              gh 4     pb 3     tvl 9              Ap -1     Bp -1     D -7    Rectal: no hemorrhoids, fissures or masses    PVR (by Ultrasound): 10 ml         IMPRESSION AND PLAN:  Lashell JAYCE JacksonOttoniel is a 49 y.o. who presents with BHASKAR U>S, constipation    Concern for rectocele  - reassured normal exam today  - prolapse symptoms resolved with kegels    OAB  - discussed etiology of OAB and potential management options  - reviewed bladder health recommendations (fluid intake, avoiding bladder triggers)  - discussed treatment options: PFPT, medications, PTNS, intradetrusor " botox, SNM  - opting for PFPT, referral made today    JOCELYNE  - discussed etiology of JOCELYNE and potential risk factors  - reviewed management strategies including PFPT, anti-incontinence ring, urethral bulking injections and midurethral sling placement  - opting for PFPT, referral made today     Constipation  - reviewed strategies for managing constipation  -  fiber recommendation patient handout given today and advised fiber supplementation as first line option  -  reviewed use of laxatives in conjunction: miralax 1 capful daily and titrate to daily soft BM.      All questions and concerns were answered and addressed.  The patient expressed understanding and agrees with the plan.     RTC as needed    1/6/2025

## 2025-01-06 ENCOUNTER — APPOINTMENT (OUTPATIENT)
Dept: OBSTETRICS AND GYNECOLOGY | Facility: CLINIC | Age: 50
End: 2025-01-06
Payer: COMMERCIAL

## 2025-01-06 VITALS
WEIGHT: 197 LBS | SYSTOLIC BLOOD PRESSURE: 118 MMHG | DIASTOLIC BLOOD PRESSURE: 72 MMHG | BODY MASS INDEX: 29.86 KG/M2 | HEIGHT: 68 IN

## 2025-01-06 DIAGNOSIS — N39.3 SUI (STRESS URINARY INCONTINENCE, FEMALE): ICD-10-CM

## 2025-01-06 DIAGNOSIS — N32.81 OAB (OVERACTIVE BLADDER): Primary | ICD-10-CM

## 2025-01-06 DIAGNOSIS — R39.9 URINARY SYMPTOM OR SIGN: ICD-10-CM

## 2025-01-06 PROCEDURE — 3078F DIAST BP <80 MM HG: CPT | Performed by: STUDENT IN AN ORGANIZED HEALTH CARE EDUCATION/TRAINING PROGRAM

## 2025-01-06 PROCEDURE — 3074F SYST BP LT 130 MM HG: CPT | Performed by: STUDENT IN AN ORGANIZED HEALTH CARE EDUCATION/TRAINING PROGRAM

## 2025-01-06 PROCEDURE — 4010F ACE/ARB THERAPY RXD/TAKEN: CPT | Performed by: STUDENT IN AN ORGANIZED HEALTH CARE EDUCATION/TRAINING PROGRAM

## 2025-01-06 PROCEDURE — 3008F BODY MASS INDEX DOCD: CPT | Performed by: STUDENT IN AN ORGANIZED HEALTH CARE EDUCATION/TRAINING PROGRAM

## 2025-01-06 PROCEDURE — 99204 OFFICE O/P NEW MOD 45 MIN: CPT | Performed by: STUDENT IN AN ORGANIZED HEALTH CARE EDUCATION/TRAINING PROGRAM

## 2025-01-06 ASSESSMENT — PAIN SCALES - GENERAL: PAINLEVEL_OUTOF10: 0-NO PAIN

## 2025-01-09 NOTE — PROGRESS NOTES
"Physical Therapy Treatment    Patient Name: Lashell Alcazar  MRN: [id number]  Today's Date: 1/13/2025  Time Calculation  Start Time: 0548  Stop Time: 0630  Time Calculation (min): 42 min     PT Therapeutic Procedures Time Entry  Manual Therapy Time Entry: 4  Therapeutic Exercise Time Entry: 38                 Current Problem  1. Tear of right supraspinatus tendon  Follow Up In Physical Therapy      2. Tear of right glenoid labrum, subsequent encounter  Follow Up In Physical Therapy      3. Biceps tendonosis of right shoulder  Follow Up In Physical Therapy      4. Osteoarthritis of right acromioclavicular joint  Follow Up In Physical Therapy          Insurance:  Visit number: 2  [facility] BMN PT OT COPAY [id number]),COVERAGE [phone] NO AUTH REQ       Precautions   none    Subjective   Subjective:   Pt reports that her shoulder isn't doing too bad. Still gets pain if she over uses it, but is sleeping better. The worst pain was today was 3/10  Pain   0/10    Objective   Treatments:  Exercises  - Supine Shoulder Flexion w/ wand 20x  - Supine Shoulder External Rotation with Dowel  -2x10  -Supine shoulder SA punches 2x10  - Standing Shoulder Abduction AAROM with Dowel  2x10  - Standing Bilateral Shoulder Internal Rotation AAROM with Dowel 15x -Standing Shoulder Extension with Dowel  - Sidelying Shoulder flx, abd, ER  -shoulder ER/IR, flex wall isometrics 10\"x5  -SA wall slides 15x  - shoulder rows/ext Rtb 20x  -shoulder IR Rtb 20x  -Shoulder ER iso walkouts 10x    PROM R shoulder 5'  OP EDUCATION:     Access Code: [email or web address]  URL: [email or web address]  Date: 01/13/2025  Prepared by: Mary Trevino    Exercises  - Standing Bilateral Low Shoulder Row with Anchored Resistance  - 1 x daily - 7 x weekly - 2 sets - 10 reps  - Shoulder Extension with Resistance Hands Down  - 1 x daily - 7 x weekly - 2 sets - 10 reps  - Shoulder External Rotation Reactive Isometrics  - 1 x daily - 7 x weekly - 10 " reps  - Shoulder Internal Rotation Reactive Isometrics  - 1 x daily - 7 x weekly - 10 reps    Assessment:   Pt able to use good form with s/l 4-way. No pain reported with wand exercises. Pt felt some discomfort with IR iso, so advised not to push as hard. Introduced SA wall slides with good form. Trialed TB IR, and pt seemed to rotate the trunk, so did ER with iso step outs. Added rows and ext with cues to retract the scapula. Good overall tolerance to therapy session.    Plan:   Cont with RTC and scapular strengthening

## 2025-01-11 DIAGNOSIS — I10 BENIGN ESSENTIAL HYPERTENSION: ICD-10-CM

## 2025-01-13 ENCOUNTER — TREATMENT (OUTPATIENT)
Dept: PHYSICAL THERAPY | Facility: CLINIC | Age: 50
End: 2025-01-13
Payer: COMMERCIAL

## 2025-01-13 DIAGNOSIS — M67.813 BICEPS TENDONOSIS OF RIGHT SHOULDER: ICD-10-CM

## 2025-01-13 DIAGNOSIS — S43.431D TEAR OF RIGHT GLENOID LABRUM, SUBSEQUENT ENCOUNTER: ICD-10-CM

## 2025-01-13 DIAGNOSIS — M75.101 TEAR OF RIGHT SUPRASPINATUS TENDON: Primary | ICD-10-CM

## 2025-01-13 DIAGNOSIS — M19.011 OSTEOARTHRITIS OF RIGHT ACROMIOCLAVICULAR JOINT: ICD-10-CM

## 2025-01-13 PROCEDURE — 97110 THERAPEUTIC EXERCISES: CPT | Mod: GP,CQ

## 2025-01-15 RX ORDER — LOSARTAN POTASSIUM 25 MG/1
25 TABLET ORAL DAILY
Qty: 30 TABLET | Refills: 0 | OUTPATIENT
Start: 2025-01-15

## 2025-01-20 ENCOUNTER — APPOINTMENT (OUTPATIENT)
Dept: PHYSICAL THERAPY | Facility: CLINIC | Age: 50
End: 2025-01-20
Payer: COMMERCIAL

## 2025-01-20 ENCOUNTER — APPOINTMENT (OUTPATIENT)
Dept: ORTHOPEDIC SURGERY | Facility: CLINIC | Age: 50
End: 2025-01-20
Payer: COMMERCIAL

## 2025-01-20 DIAGNOSIS — M75.101 TEAR OF RIGHT SUPRASPINATUS TENDON: Primary | ICD-10-CM

## 2025-01-20 DIAGNOSIS — M19.011 OSTEOARTHRITIS OF RIGHT ACROMIOCLAVICULAR JOINT: ICD-10-CM

## 2025-01-20 DIAGNOSIS — S43.431D TEAR OF RIGHT GLENOID LABRUM, SUBSEQUENT ENCOUNTER: ICD-10-CM

## 2025-01-20 DIAGNOSIS — M67.813 BICEPS TENDONOSIS OF RIGHT SHOULDER: ICD-10-CM

## 2025-01-27 ENCOUNTER — APPOINTMENT (OUTPATIENT)
Dept: PHYSICAL THERAPY | Facility: CLINIC | Age: 50
End: 2025-01-27
Payer: COMMERCIAL

## 2025-01-28 ENCOUNTER — APPOINTMENT (OUTPATIENT)
Dept: OBSTETRICS AND GYNECOLOGY | Facility: CLINIC | Age: 50
End: 2025-01-28
Payer: COMMERCIAL

## 2025-02-03 ENCOUNTER — TREATMENT (OUTPATIENT)
Dept: PHYSICAL THERAPY | Facility: CLINIC | Age: 50
End: 2025-02-03
Payer: COMMERCIAL

## 2025-02-03 DIAGNOSIS — M19.011 OSTEOARTHRITIS OF RIGHT ACROMIOCLAVICULAR JOINT: ICD-10-CM

## 2025-02-03 DIAGNOSIS — S43.431D TEAR OF RIGHT GLENOID LABRUM, SUBSEQUENT ENCOUNTER: ICD-10-CM

## 2025-02-03 DIAGNOSIS — M75.101 TEAR OF RIGHT SUPRASPINATUS TENDON: ICD-10-CM

## 2025-02-03 DIAGNOSIS — M67.813 BICEPS TENDONOSIS OF RIGHT SHOULDER: ICD-10-CM

## 2025-02-03 PROCEDURE — 97110 THERAPEUTIC EXERCISES: CPT | Mod: GP

## 2025-02-03 NOTE — PROGRESS NOTES
Physical Therapy Treatment    Patient Name: Lashell Alcazar  MRN: [id number]  Time Calculation  Start Time: 0523  Stop Time: 0601  Time Calculation (min): 38 min     PT Therapeutic Procedures Time Entry  Therapeutic Exercise Time Entry: 38                   Current Problem  1. Tear of right supraspinatus tendon  Follow Up In Physical Therapy      2. Tear of right glenoid labrum, subsequent encounter  Follow Up In Physical Therapy      3. Biceps tendonosis of right shoulder  Follow Up In Physical Therapy      4. Osteoarthritis of right acromioclavicular joint  Follow Up In Physical Therapy      Insurance:  Visit number: 3  [facility] BMN PT OT COPAY [id number]),COVERAGE [phone] NO AUTH REQ     Subjective   General  Pt reports that her shoulder is still sore, but it is manageable. She was just on vacation, so she has not been doing her exercises like she should.  Precautions  None   Pain  2/10, R shoulder    Objective     Treatments:  UBE F/R: 2'/2'  SA wall slides: 2x10  Standing wand abd/IR/ext: 2# 2x10 each  Standing IYT: 1# x20 each  Bent rows/h.abd/ext: 2#/1# x20 each  Supine wand CP/flex/ER: 2# 2x10 each  Supine SA punch: 1# x20  Sidelying 4-way shoulder: 1# x15 each  4-way ball on wall: x20 each  Med ball series: x20 each    OP EDUCATION/HEP:  Access Code: [email or web address]  URL: [email or web address]  Date: 02/03/2025  Prepared by: Nakia Rebollar    Exercises  - Standing Shoulder Flexion to 90 Degrees with Dumbbells  - 1 x daily - 7 x weekly - 2 sets - 10 reps  - Scaption with Dumbbells  - 1 x daily - 7 x weekly - 2 sets - 10 reps  - Shoulder Abduction with Dumbbells - Thumbs Up  - 1 x daily - 7 x weekly - 2 sets - 10 reps  - Bent Over Single Arm Shoulder Row with Dumbbell  - 1 x daily - 7 x weekly - 2 sets - 10 reps  - Single Arm Bent Over Shoulder Horizontal Abduction with Dumbbell - Palm Down  - 1 x daily - 7 x weekly - 2 sets - 10 reps  - Single Arm Bent Over Shoulder  Extension with Dumbbell  - 1 x daily - 7 x weekly - 2 sets - 10 reps  - Supine Scapular Protraction in Flexion with Dumbbells  - 1 x daily - 7 x weekly - 2 sets - 10 reps    Assessment   Able to increase reps/resistance/difficulty for various exercises this date. Able to add further strengthening and endurance exercises, HEP updated as above. Pt has no c/o pain w/ all exercises this date, only muscle fatigue as expected. Pt would continue to benefit from PT services to continue to decrease pain, increase ROM/tissue mobility, improve strength and posture to facilitate return to prior level of activities as able.     Plan   Continue to progress strength and endurance to pt tolerance

## 2025-02-04 ENCOUNTER — APPOINTMENT (OUTPATIENT)
Dept: OBSTETRICS AND GYNECOLOGY | Facility: CLINIC | Age: 50
End: 2025-02-04
Payer: COMMERCIAL

## 2025-02-06 NOTE — PROGRESS NOTES
Physical Therapy Treatment    Patient Name: Lashell Alcazar  MRN: [id number]  Today's Date: 2/10/2025  Time Calculation  Start Time: 0533  Stop Time: 0620  Time Calculation (min): 47 min     PT Therapeutic Procedures Time Entry  Manual Therapy Time Entry: 8  Therapeutic Exercise Time Entry: 36                 Current Problem  1. Tear of right supraspinatus tendon  Follow Up In Physical Therapy      2. Tear of right glenoid labrum, subsequent encounter  Follow Up In Physical Therapy      3. Biceps tendonosis of right shoulder  Follow Up In Physical Therapy      4. Osteoarthritis of right acromioclavicular joint  Follow Up In Physical Therapy          Insurance:  Visit number: 4  [facility] BMN PT OT COPAY [id number]),COVERAGE [phone] NO AUTH REQ      Precautions   None     Subjective   Subjective:   Pt reports that her shoulder has had some pain since last visit. Certain motions at work bother her.  Pain   4/10    Objective   Treatments:  UBE F/R: 2'/2'  Supine wand CP/flex/ER: 1# 2x10 each  Supine SA punch: 1# x20  Supine alphabet 1# 1x  Supine D1/D2 0# 2x10  Sidelying 4-way shoulder: 0# x15 each  Shoulder rows/ext Rtb 2x10  Shoulder ER/IR iso walkouts RTB 10x ea    PROM R shoulder, STM to R UT 8'    Not Today:  SA wall slides: 2x10  Standing wand abd/IR/ext: 2# 2x10 each  Standing IYT: 1# x20 each  Bent rows/h.abd/ext: 2#/1# x20 each  4-way ball on wall: x20 each  Med ball series: x20 each  OP EDUCATION:   S/l ex, TB rows, ext      Assessment:   Kept weights/resistance lower today, due to increased pain. Introduced D1D2 patterns for gentle functional reaching. Pt a little challenged with reaching to end ranges. Also added alphabet for more scapular strengthening. Cues given with rows and ext to not extend the arms past the body. Pt able to maintain correct position with ER/IR iso walkouts. Fair tolerance to therapy session.    Plan:   Cont with gentle RTC and scapular strengthening.

## 2025-02-10 ENCOUNTER — TREATMENT (OUTPATIENT)
Dept: PHYSICAL THERAPY | Facility: CLINIC | Age: 50
End: 2025-02-10
Payer: COMMERCIAL

## 2025-02-10 DIAGNOSIS — M75.101 TEAR OF RIGHT SUPRASPINATUS TENDON: Primary | ICD-10-CM

## 2025-02-10 DIAGNOSIS — S43.431D TEAR OF RIGHT GLENOID LABRUM, SUBSEQUENT ENCOUNTER: ICD-10-CM

## 2025-02-10 DIAGNOSIS — M19.011 OSTEOARTHRITIS OF RIGHT ACROMIOCLAVICULAR JOINT: ICD-10-CM

## 2025-02-10 DIAGNOSIS — M67.813 BICEPS TENDONOSIS OF RIGHT SHOULDER: ICD-10-CM

## 2025-02-10 PROCEDURE — 97110 THERAPEUTIC EXERCISES: CPT | Mod: GP,CQ

## 2025-02-10 PROCEDURE — 97140 MANUAL THERAPY 1/> REGIONS: CPT | Mod: GP,CQ

## 2025-02-11 ENCOUNTER — APPOINTMENT (OUTPATIENT)
Dept: OBSTETRICS AND GYNECOLOGY | Facility: CLINIC | Age: 50
End: 2025-02-11
Payer: COMMERCIAL

## 2025-02-11 VITALS
BODY MASS INDEX: 30.62 KG/M2 | WEIGHT: 202 LBS | SYSTOLIC BLOOD PRESSURE: 118 MMHG | HEIGHT: 68 IN | DIASTOLIC BLOOD PRESSURE: 80 MMHG

## 2025-02-11 DIAGNOSIS — Z12.31 ENCOUNTER FOR SCREENING MAMMOGRAM FOR MALIGNANT NEOPLASM OF BREAST: ICD-10-CM

## 2025-02-11 DIAGNOSIS — Z01.419 ENCNTR FOR GYN EXAM (GENERAL) (ROUTINE) W/O ABN FINDINGS: Primary | ICD-10-CM

## 2025-02-11 DIAGNOSIS — Z12.4 SCREENING FOR CERVICAL CANCER: ICD-10-CM

## 2025-02-11 PROCEDURE — 87624 HPV HI-RISK TYP POOLED RSLT: CPT

## 2025-02-11 PROCEDURE — 3079F DIAST BP 80-89 MM HG: CPT | Performed by: ADVANCED PRACTICE MIDWIFE

## 2025-02-11 PROCEDURE — 3074F SYST BP LT 130 MM HG: CPT | Performed by: ADVANCED PRACTICE MIDWIFE

## 2025-02-11 PROCEDURE — 3008F BODY MASS INDEX DOCD: CPT | Performed by: ADVANCED PRACTICE MIDWIFE

## 2025-02-11 PROCEDURE — 99396 PREV VISIT EST AGE 40-64: CPT | Performed by: ADVANCED PRACTICE MIDWIFE

## 2025-02-11 PROCEDURE — 4010F ACE/ARB THERAPY RXD/TAKEN: CPT | Performed by: ADVANCED PRACTICE MIDWIFE

## 2025-02-11 ASSESSMENT — PATIENT HEALTH QUESTIONNAIRE - PHQ9
2. FEELING DOWN, DEPRESSED OR HOPELESS: NOT AT ALL
SUM OF ALL RESPONSES TO PHQ9 QUESTIONS 1 AND 2: 0
1. LITTLE INTEREST OR PLEASURE IN DOING THINGS: NOT AT ALL

## 2025-02-11 NOTE — PROGRESS NOTES
"GYNECOLOGY PROGRESS NOTE        CC:  see below. Has a Hx of abnormal paps years ago. More current pap tests wnl. No menses. Has some hot flashes but also had Mirena IUD placed about 5 years now. No menses with IUD in place. No concerns about discharge. No breast issues.   Chief Complaint   Patient presents with    Annual Exam     Est pt annual exam.   Pap 10/17 neg   Mamm 10/22 cat 2  Iud Mirena for 5 years        HPI:  Lashell Alcazar is here for a routine GYN examination.  No GYN c/o, no AUB.            ROS:  GI - no blood in BMs  URO - no hematuria  GYN - no AUB or vaginal discharge  PSYCH - mood OK        PHYSICAL EXAM:  /80 (BP Location: Left arm, Patient Position: Sitting, BP Cuff Size: Adult)   Ht 1.727 m (5' 8\")   Wt 91.6 kg (202 lb)   BMI 30.71 kg/m²   GEN:  A&O, NAD  URO:  normal urethra, no bladder TTP  GYN:  normal vulva and perineum w/o lesions or ulcers, normal vagina without discharge or lesions, normal cervix without lesions or discharge or CMT, IUD strings noted.  uterus NT/NE, adnexa mobile and NT/NE  BREAST:  no masses or TTP, no skin lesions or nipple discharge   PSYCH:  normal affect, non-anxious      IMPRESSION/PLAN:  A: normal gyn exam. Normal breast exam. IUD strings noted.  Plan: 1. Pap done. 2. Mammogram ordered. 3. IUD due out in 3 years.  Problem List Items Addressed This Visit    None  Visit Diagnoses       Screening for cervical cancer        Encounter for screening mammogram for malignant neoplasm of breast                   ASCCP pap smear screening guidelines reviewed with the patient.        Joselin Ennis, FELIX-BETY  "

## 2025-02-13 NOTE — PROGRESS NOTES
"Physical Therapy Treatment    Patient Name: Lashell Alcazar  MRN: [id number]  Today's Date: 2/17/2025  Time Calculation  Start Time: 0549  Stop Time: 0628  Time Calculation (min): 39 min     PT Therapeutic Procedures Time Entry  Manual Therapy Time Entry: 10  Therapeutic Exercise Time Entry: 28                 Current Problem  1. Tear of right supraspinatus tendon  Follow Up In Physical Therapy      2. Tear of right glenoid labrum, subsequent encounter  Follow Up In Physical Therapy      3. Biceps tendonosis of right shoulder  Follow Up In Physical Therapy      4. Osteoarthritis of right acromioclavicular joint  Follow Up In Physical Therapy          Insurance:  Visit number: 5  [facility] BMN PT OT COPAY [id number]),COVERAGE 65 [phone] NO AUTH REQ      Precautions   none    Subjective   Subjective:   Pt reports that her shoulder doesn't feel bad, however, she has numbness and tingling from the elbow down into the fingers.  Pain   0/10    Objective   Treatments:  UBE F/R: 2'/2'  Seated UT and levator stretch 30\"x2  Supine chin tucks/seated chin tucks 3\"x15ea  Supine laying with R UE unloaded and towel roll behind neck 3'  Shoulder rows/ext Rtb 2x10  B ER 20x    Manual cervical traction, STM to B UT 10'    Not Today:  Supine wand CP/flex/ER: 1# 2x10 each  Supine SA punch: 1# x20  Supine alphabet 1# 1x  Supine D1/D2 0# 2x10  Sidelying 4-way shoulder: 0# x15 each  Shoulder ER/IR iso walkouts RTB 10x ea  SA wall slides: 2x10  Standing wand abd/IR/ext: 2# 2x10 each  Standing IYT: 1# x20 each  Bent rows/h.abd/ext: 2#/1# x20 each  4-way ball on wall: x20 each  Med ball series: x20 each  OP EDUCATION:     Access Code: [email or web address]  URL: [email or web address]  Date: 02/17/2025  Prepared by: Mary Trevino    Exercises  - Seated Cervical Retraction  - 1 x daily - 7 x weekly - 2 sets - 10 reps - 3 hold  - Shoulder External Rotation and Scapular Retraction  - 1 x daily - 7 x weekly - 2 sets - 10 " reps    Assessment:  Pt numbness/tingling in the arm/fingers, may be due to a pinched nerve in cervical region stemming from poor posture and weakness in the R shoulder due to RTC tear. Tried supine position with unloaded R UE, but pt still had numbness in her thumb. Trialed manual cervical traction, without relief. Introduced chin tucks, with cues to not over emphasize protraction. Pt able to use good form with scap strengthening ex.     Plan:   Monitor arm symptoms

## 2025-02-17 ENCOUNTER — TREATMENT (OUTPATIENT)
Dept: PHYSICAL THERAPY | Facility: CLINIC | Age: 50
End: 2025-02-17
Payer: COMMERCIAL

## 2025-02-17 DIAGNOSIS — S43.431D TEAR OF RIGHT GLENOID LABRUM, SUBSEQUENT ENCOUNTER: ICD-10-CM

## 2025-02-17 DIAGNOSIS — M75.101 TEAR OF RIGHT SUPRASPINATUS TENDON: Primary | ICD-10-CM

## 2025-02-17 DIAGNOSIS — M19.011 OSTEOARTHRITIS OF RIGHT ACROMIOCLAVICULAR JOINT: ICD-10-CM

## 2025-02-17 DIAGNOSIS — M67.813 BICEPS TENDONOSIS OF RIGHT SHOULDER: ICD-10-CM

## 2025-02-17 PROCEDURE — 97140 MANUAL THERAPY 1/> REGIONS: CPT | Mod: GP,CQ

## 2025-02-17 PROCEDURE — 97110 THERAPEUTIC EXERCISES: CPT | Mod: GP,CQ

## 2025-02-19 DIAGNOSIS — E11.69 TYPE 2 DIABETES MELLITUS WITH OTHER SPECIFIED COMPLICATION, WITHOUT LONG-TERM CURRENT USE OF INSULIN (HCC): ICD-10-CM

## 2025-02-19 RX ORDER — TIRZEPATIDE 12.5 MG/.5ML
INJECTION, SOLUTION SUBCUTANEOUS
Qty: 6 ML | Refills: 3 | Status: SHIPPED | OUTPATIENT
Start: 2025-02-19

## 2025-02-21 LAB
CYTOLOGY CMNT CVX/VAG CYTO-IMP: NORMAL
HPV HR 12 DNA GENITAL QL NAA+PROBE: NEGATIVE
HPV HR GENOTYPES PNL CVX NAA+PROBE: NEGATIVE
HPV16 DNA SPEC QL NAA+PROBE: NEGATIVE
HPV18 DNA SPEC QL NAA+PROBE: NEGATIVE
LAB AP HPV GENOTYPE QUESTION: YES
LAB AP HPV HR: NORMAL
LABORATORY COMMENT REPORT: NORMAL
PATH REPORT.TOTAL CANCER: NORMAL

## 2025-02-22 ENCOUNTER — HOSPITAL ENCOUNTER (OUTPATIENT)
Dept: RADIOLOGY | Facility: HOSPITAL | Age: 50
Discharge: HOME | End: 2025-02-22
Payer: COMMERCIAL

## 2025-02-22 VITALS — WEIGHT: 202 LBS | HEIGHT: 68 IN | BODY MASS INDEX: 30.62 KG/M2

## 2025-02-22 DIAGNOSIS — Z12.31 ENCOUNTER FOR SCREENING MAMMOGRAM FOR MALIGNANT NEOPLASM OF BREAST: ICD-10-CM

## 2025-02-22 PROCEDURE — 77067 SCR MAMMO BI INCL CAD: CPT | Mod: BILATERAL PROCEDURE | Performed by: RADIOLOGY

## 2025-02-22 PROCEDURE — 77063 BREAST TOMOSYNTHESIS BI: CPT | Mod: BILATERAL PROCEDURE | Performed by: RADIOLOGY

## 2025-02-22 PROCEDURE — 77063 BREAST TOMOSYNTHESIS BI: CPT

## 2025-02-24 ENCOUNTER — TREATMENT (OUTPATIENT)
Dept: PHYSICAL THERAPY | Facility: CLINIC | Age: 50
End: 2025-02-24
Payer: COMMERCIAL

## 2025-02-24 DIAGNOSIS — M67.813 BICEPS TENDONOSIS OF RIGHT SHOULDER: ICD-10-CM

## 2025-02-24 DIAGNOSIS — S43.431D TEAR OF RIGHT GLENOID LABRUM, SUBSEQUENT ENCOUNTER: ICD-10-CM

## 2025-02-24 DIAGNOSIS — M75.101 TEAR OF RIGHT SUPRASPINATUS TENDON: ICD-10-CM

## 2025-02-24 DIAGNOSIS — M19.011 OSTEOARTHRITIS OF RIGHT ACROMIOCLAVICULAR JOINT: ICD-10-CM

## 2025-02-24 PROCEDURE — 97110 THERAPEUTIC EXERCISES: CPT | Mod: GP

## 2025-02-24 NOTE — PROGRESS NOTES
"Physical Therapy Recheck/Treatment    Patient Name: Lashell Alcazar  MRN: [id number]  Time Calculation  Start Time: 0520  Stop Time: 0600  Time Calculation (min): 40 min     PT Therapeutic Procedures Time Entry  Therapeutic Exercise Time Entry: 40                   Current Problem  1. Tear of right supraspinatus tendon  Follow Up In Physical Therapy      2. Tear of right glenoid labrum, subsequent encounter  Follow Up In Physical Therapy      3. Biceps tendonosis of right shoulder  Follow Up In Physical Therapy      4. Osteoarthritis of right acromioclavicular joint  Follow Up In Physical Therapy      Insurance:  Visit number: 6  [facility] BMN PT OT COPAY 40 ([id number]),COVERAGE 65 [phone]) NO AUTH REQ     Subjective   General  Patient reports that her shoulder is continuing to improve, however she is still getting numbness and occasional shooting pains from her R elbow to her fingers.   Precautions  None   Pain  2/10 R shoulder    Objective   Shoulder Musculoskeletal Exam    Range of Motion    Right      Active ROM: abnormal and pain.       Active forward elevation: 150.       Shoulder active abduction: 140.       Active external rotation at side: 50.       Internal rotation: T10.     Left      Left shoulder range of motion is normal.     Strength    Right      External rotation: 5/5.       Internal rotation: 5/5.       Abduction: 5/5.       Biceps: 5/5.       Triceps: 5/5.     Left      External rotation: 5/5.       Internal rotation: 5/5.       Abduction: 5/5.       Biceps: 5/5.       Triceps: 5/5.        Treatments:  Includes measurements for recheck  UBE F/R: 2'/2'  Supine chin tucks w/ towel: 2x10  Supine B h.abd/B ER: RTB 2x10 each  Supine SA punches: 2x10  Wall angels: x10  Rows/LAE: GTB x20 each  Seated thoracic extension stretch: x20  Seated lat stretch w/ dowel: 5\"x10    Outcome Measures:  Other Measures  Disability of Arm Shoulder Hand (DASH): 32    OP EDUCATION/HEP:  Access Code: [email or web address]  URL: " [email or web address]  Date: 02/24/2025  Prepared by: Nakia Rebollar    Exercises  - Supine Cervical Retraction with Towel  - 6-8 x daily - 7 x weekly - 1 sets - 10 reps  - Supine Shoulder Horizontal Abduction with Resistance  - 1 x daily - 7 x weekly - 2 sets - 10 reps  - Supine Shoulder External Rotation with Resistance  - 1 x daily - 7 x weekly - 2 sets - 10 reps  - Supine Single Arm Shoulder Protraction  - 1 x daily - 7 x weekly - 2 sets - 10 reps  - Cervical Extension AROM with Strap  - 1 x daily - 7 x weekly - 1-2 sets - 10 reps  - Seated Assisted Cervical Rotation with Towel  - 1 x daily - 7 x weekly - 1-2 sets - 10 reps  - Seated Thoracic Lumbar Extension with Pectoralis Stretch  - 1 x daily - 7 x weekly - 1-2 sets - 10 reps  - Prone Chest Stretch on Chair  - 1 x daily - 7 x weekly - 1 sets - 10 reps - 5 sec hold  - Wall Berkley  - 1 x daily - 7 x weekly - 1 sets - 10 reps    Assessment   Patient is a 50 y/o F who has completed 6 PT visits for c/o R shoulder pain d/t rotator cuff tear. Patint has made consistent progress towards all functional goals since starting PT, and feels she is at least 30% improved. Patient demos decreased R shoulder pain, improved R shoulder ROM/tissue mobility, improved RUE strength. Has developed some numbness/tingling and pain from R elbow to fingers, which can be centralized with supine chin tucks with towel. Remainder of treatment session focused on scapular and postural strengthening. At this time, patient would like to be placed on hold from PT to continue with her exercises as she is going to be starting pelvic floor PT. Patient will call to resume R shoulder/neck PT after completion of pelvic floor PT.     Goals:  Pt will report at least 75% improvement in R shoulder pain during everyday activities. PROGRESSING  Pt will demo >/= 4+/5 strength of Rshoulder musculature without pain. PROGRESSING  Pt will demo full and symmetrical A/PROM of R shoulder  without pain. PROGRESSING  Pt will improve Quick DASH score by at least 20% (MCID) to indicate a significant improvement in overall function. PROGRESSING  Pt will demo independence and report compliance with HEP. PROGRESSING    Plan   Patient to hold R shoulder/neck PT until completion of pelvic floor PT

## 2025-02-25 ENCOUNTER — HOSPITAL ENCOUNTER (OUTPATIENT)
Dept: RADIOLOGY | Facility: EXTERNAL LOCATION | Age: 50
Discharge: HOME | End: 2025-02-25

## 2025-02-26 ENCOUNTER — TELEPHONE (OUTPATIENT)
Dept: ENDOCRINOLOGY | Age: 50
End: 2025-02-26

## 2025-02-27 DIAGNOSIS — I10 BENIGN ESSENTIAL HYPERTENSION: ICD-10-CM

## 2025-02-27 NOTE — TELEPHONE ENCOUNTER
Mounjaro 12.5 mg/0.5 ml prior authorization request submitted online ([email or web address] to [facility]), clinical uploaded, medication approved.    Approved today by [facility] 2017  Your PA request has been approved. Additional information will be provided in the approval communication. (Message 1141)  Effective Date: 1/28/2025  Authorization Expiration Date: 2/27/2026

## 2025-03-03 ENCOUNTER — EVALUATION (OUTPATIENT)
Dept: PHYSICAL THERAPY | Facility: CLINIC | Age: 50
End: 2025-03-03
Payer: COMMERCIAL

## 2025-03-03 DIAGNOSIS — N39.3 SUI (STRESS URINARY INCONTINENCE, FEMALE): ICD-10-CM

## 2025-03-03 DIAGNOSIS — N32.81 OAB (OVERACTIVE BLADDER): ICD-10-CM

## 2025-03-03 PROCEDURE — 97161 PT EVAL LOW COMPLEX 20 MIN: CPT | Mod: GP | Performed by: PHYSICAL THERAPIST

## 2025-03-03 PROCEDURE — 97110 THERAPEUTIC EXERCISES: CPT | Mod: GP | Performed by: PHYSICAL THERAPIST

## 2025-03-03 RX ORDER — LOSARTAN POTASSIUM 25 MG/1
25 TABLET ORAL DAILY
Qty: 30 TABLET | Refills: 6 | Status: SHIPPED | OUTPATIENT
Start: 2025-03-03

## 2025-03-03 NOTE — PROGRESS NOTES
Physical Therapy Evaluation and Treatment      Patient Name: Lashell Alcazar  MRN: [id number]  Today's Date: 3/4/2025  Treatment Date: 3/3/2025  Time Calculation  Start Time: 0538  Stop Time: 0632  Time Calculation (min): 54 min      PT Evaluation Time Entry  PT Evaluation (Low) Time Entry: 30  PT Therapeutic Procedures Time Entry  Therapeutic Exercise Time Entry: 23                   INSURANCE:  Visit number: 1/5  [facility] BMN PT OT COPAY 40 (42) 700(42) COVERAGE 65 OOP 6000(134) NO AUTH REQ  # [id number]  [facility] [id number]/ALL    Referring Provider: Alana Fitzgerald MD  Hx: DM, OA, Prolapse, 2 vaginal deliveries     ASSESSMENT:  PT Assessment Results: decreased knowledge of HEP, activity limitations, ADLs/IADLs/self care skills, flexibility, motor function/control/tone, pain, participation restrictions, range of motion/joint mobility, strength, posture, transfers, coordination, balance, gait/locomotion, decreased knowledge of precautions.  Rehab Prognosis: Good  Evaluation/Treatment Tolerance: Patient tolerated treatment well  Stable and/or uncomplicated characteristics    Lashell Alcazar is a 49 y.o. female presenting to the clinic with mixed urinary incontinence with frequency and prolapse. Pt demonstrates decreased ROM and strength of the pelvis/B hips and abdominals causing pain and dysfunction with toileting, coughing, sneezing, sitting, standing, bending, lifting, and STSs. Pt was given and reviewed HEP. The patient was educated on the importance of positioning, proper posture/use of lumbar roll, and body mechanics with transfers/log roll, the importance of general therapeutic exercise, anatomy, function, & likely cause of impairments. Pt will benefit from skilled PT in order to increase ROM and strength of the pelvis/B hips and abdominals so that the pt can perform ADLs without pain and return to PLOF.     PLAN:  Treatments/Interventions: traction, gait training, dry needling, edema control,  education/instruction, home program, self care/home management, manual therapy, neuromuscular re-education, therapeutic activities, therapeutic exercises, modalities, therapeutic elastic taping.   PT Plan: Skilled PT  PT Frequency: 1 time per week  Duration: 5 visits  Onset Date: 03/03/20  Rehab Potential: Good  Plan of Care Agreement: Patient    Goals -    STG - After about 6 weeks:  Pt will report less than a 4/10 pain at the worst.  Pt will be able to manage changes in intra-abdominal pressure with appropriate pelvic floor and TA muscle activation.  Pt will be able to coordinate pelvic floor with thoracic diaphragm during functional activities that cause symptoms.  Pt will increase by 1 MMT grade for B hips.  Pt will decrease urinary leakage episodes to 0-1 times per day.    LTG - after about 12 weeks:  Pt will report less than a 0-2/10 pain at the worst.  Pt will have at least 4+/5 to 5/5 strength for B hips.   Pt will have AROM to WFL for the lumbar spine.   Pt will decrease urinary leakage episodes to 0-1 times per week.  Pt will report a significant improvement with Female NIH-CPSI score by 5 points (MDC).  Pt will be independent with progressed HEP.    CURRENT PROBLEM:   1. OAB (overactive bladder)  Referral to Physical Therapy    Follow Up In Physical Therapy      2. JOCELYNE (stress urinary incontinence, female)  Referral to Physical Therapy    Follow Up In Physical Therapy          Subjective      PELVIC HISTORY:  History of Current Episode/Chief Complaint -  Pt states that she has had issues with bladder leakage for many years, but also recently started noticing more prolapse. Pt does report urgency and frequency as well.    Date Onset - 5 years at least (3/3/20)    Mechanism of Injury -    Insidious Onset    Pelvic Pain -   Pain when emptying bladder: No  Pain with wiping or tight clothing: No  Pain with intercourse: No  History of back pain: Yes    Pain Location: Lumbar and B hips (R>L)   Pain Best:  0/10  Pain Today: 0/10  Pain Worst: 8/10   Pain Type: Intermittent, Achy/Dull, Sharp, Stiffness/Tightness  Pain Exacerbating Factors: sitting for longer periods, standing, bending, and lifting  Pain Relieving Factors: Rest, Lidocaine Patch, Heat  Difficulty Sleeping: No  Night Sweats, Loss of Appetite, Unexpected Weight-loss: No  Saddle Anesthesia: No    Pelvic Exercise -   Do you do Kegels? Yes, doing on the toilet while urination; educated pt to stop doing on toilet  Current exercise regime: walking    Bladder Hx -   Excessive Urinary Urgency: Yes   Water Consumption a day: Yes  Daytime Voiding Frequency: 10-15  Nighttime Voiding Frequency: 0  Unintentional urine loss frequency: every day multiple times  Leakage occurs with: changing position/STS, cough, laugh, sneezing, lifting, key in door, and post-voiding.  Increases Urgency: cold temp  Leakage amount: small  Difficulty initiating flow of urine: No  Slow/weak urine stream: No  Do you push to urinate: Sometimes  Able to completely empty bladder: Sometimes    Bowel Hx -   Excessive Bowel Urgency: No  BM Frequency: every third day  Frequent Diarrhea: No  Frequent constipation/straining/incomplete emptying: Yes  Supplements: Fiber, Dulcolax    Work -   Work Status: Full Time   RN - Works in a hospitals    Home Living -    Pt lives with her .    Patient Stated Goals -   Increase strength  Reduce leakage and urgency    PRECAUTIONS -    None     Prior Level of Function -    I with ADLs/IADLs     Objective     Hip AROM (degrees) - WFL grossly except IR and Extension    Hip MMT (/5) -  R hip Flexion: 4+   R hip ER: 4-   R hip IR: 4   R hip Abduction: 4   L hip Flexion: 4+    L hip ER: 4-   L hip IR: 4   L hip Abduction: 4     Lumbar AROM -   Lumbar Flexion: 70%  Lumbar Extension: 30%  R Lumbar Side Bendin%  L Lumbar Side Bendin%    Posture -   Rounded Shoulders  Forward Head  Increased Lumbar Lordosis/APT  Sway Back Posture    Functional  Assessment/Special Tests -  Trendelenburg positive bilateral (L>R)  Breathing: good diaphragmatic breath  CJ positive right  Jake positive bilateral  FADDIR: positive bilateral  SLR negative bilateral    Flexibility -   Decreased Tissue Length of: Iliopsoas, Adductors, HS B    Internal Palpation Exam - Plan to perform next visit with patient consent    Outcome Measures -   Other Measures  Other Outcome Measures: Female NIH-CPSI: 17 (Pain 0, Urinary 9, QOL 8)     Treatment -   Evaluation -   Low (74465)  Therapeutic Exercise (09630) -     Education on Micturition Cycle, Urge Control, Bladder Irritants, Bladder Diary/Instructions, Pelvic Bracing  Seated Correct Posture/Log Roll: reviewed   Modified Jake Stretch: 30 sec x2  Bridge on Heels: x10  Plan to give Elimination Position and ILU Colonic massage next visit    OP Patient Education -   Access Code: [email or web address]  URL: [email or web address]  Date: 03/03/2025  Prepared by: Sloane Hong    Exercises  - Seated Correct Posture   - Modified Jake Stretch  - 1-2 x daily - 3 sets - 30 seconds hold  - Bridge on Heels  - 1-2 x daily - 3 sets - 10 reps - 2 sec hold    Patient Education  - Log Roll    Handouts  - Micturition Cycle Handout  - Urge Control Handout   - Bladder Irritants  - Bladder Diary and Instructions   - Pelvic Bracing Handout

## 2025-03-04 ASSESSMENT — PATIENT HEALTH QUESTIONNAIRE - PHQ9
2. FEELING DOWN, DEPRESSED OR HOPELESS: NOT AT ALL
1. LITTLE INTEREST OR PLEASURE IN DOING THINGS: NOT AT ALL
SUM OF ALL RESPONSES TO PHQ9 QUESTIONS 1 AND 2: 0

## 2025-03-31 ENCOUNTER — APPOINTMENT (OUTPATIENT)
Dept: ORTHOPEDIC SURGERY | Facility: CLINIC | Age: 50
End: 2025-03-31
Payer: COMMERCIAL

## 2025-03-31 DIAGNOSIS — M77.11 LATERAL EPICONDYLITIS OF RIGHT ELBOW: ICD-10-CM

## 2025-03-31 DIAGNOSIS — M75.82 TENDINITIS OF LEFT ROTATOR CUFF: Primary | ICD-10-CM

## 2025-03-31 DIAGNOSIS — M75.101 TEAR OF RIGHT SUPRASPINATUS TENDON: ICD-10-CM

## 2025-03-31 DIAGNOSIS — M75.101 TEAR OF RIGHT ROTATOR CUFF, UNSPECIFIED TEAR EXTENT, UNSPECIFIED WHETHER TRAUMATIC: ICD-10-CM

## 2025-03-31 PROCEDURE — 99214 OFFICE O/P EST MOD 30 MIN: CPT | Performed by: ORTHOPAEDIC SURGERY

## 2025-03-31 PROCEDURE — 4010F ACE/ARB THERAPY RXD/TAKEN: CPT | Performed by: ORTHOPAEDIC SURGERY

## 2025-03-31 PROCEDURE — 20605 DRAIN/INJ JOINT/BURSA W/O US: CPT | Performed by: ORTHOPAEDIC SURGERY

## 2025-03-31 PROCEDURE — 20610 DRAIN/INJ JOINT/BURSA W/O US: CPT | Performed by: ORTHOPAEDIC SURGERY

## 2025-03-31 RX ORDER — LIDOCAINE HYDROCHLORIDE 10 MG/ML
5 INJECTION, SOLUTION INFILTRATION; PERINEURAL
Status: COMPLETED | OUTPATIENT
Start: 2025-03-31 | End: 2025-03-31

## 2025-03-31 RX ORDER — BETAMETHASONE SODIUM PHOSPHATE AND BETAMETHASONE ACETATE 3; 3 MG/ML; MG/ML
2 INJECTION, SUSPENSION INTRA-ARTICULAR; INTRALESIONAL; INTRAMUSCULAR; SOFT TISSUE
Status: COMPLETED | OUTPATIENT
Start: 2025-03-31 | End: 2025-03-31

## 2025-03-31 RX ADMIN — BETAMETHASONE SODIUM PHOSPHATE AND BETAMETHASONE ACETATE 2 ML: 3; 3 INJECTION, SUSPENSION INTRA-ARTICULAR; INTRALESIONAL; INTRAMUSCULAR; SOFT TISSUE at 16:44

## 2025-03-31 RX ADMIN — LIDOCAINE HYDROCHLORIDE 5 ML: 10 INJECTION, SOLUTION INFILTRATION; PERINEURAL at 16:44

## 2025-03-31 NOTE — PROGRESS NOTES
History of present illness: Patient with a known history of right small but full-thickness rotator cuff tear    [facility] and is doing pretty well    In the meantime she has developed right elbow extensor tendinitis and left shoulder bursal pain    Physical exam:    General: No acute distress or breathing difficulty or discomfort, pleasant and cooperative with the examination.    Extremities: Left shoulder is examined    The left shoulder was inspected and was found to have no obvious deformity.  There was tenderness to touch over the lateral edges of the shoulder over the rotator cuff insertion.  Active forward flexion 120 degrees, external rotation to 60 degrees, abduction to 45 degrees, and internal rotation to the level of L2.    At this time the shoulder is neurovascular tact and neurosensory intact.  Motor intact C5-T1.  There was no obvious neck pain or radiculopathy noted.  There was no gross instability or adhesive capsulitis symptoms.  There was no evidence of apprehension or apprehension suppression.    Strength was tested in 4 planes with weakness in the supraspinatus strength testing and external rotation position.  There was no strength deficit in internal rotation.  Impingement signs were positive both supine and standing for impingement test type I and II.  There was mild pain over the bicipital groove with a positive speeds sign    Before aspiration injection the risks of a cortisone injection including infection, local skin irritation, skin atrophy, calcification, continued pain and discomfort, elevated blood sugar, burning, failure to relieve pain, possible late infection were discussed with the patient.    Postprocedure discomfort can be alleviated with additional medications, ice, elevation, rest over the first 24 hours as recommended.    Patient verbalized understanding and wanted to proceed with the planned procedure.    After informed consent  was provided and allergies verified, the patient was positioned appropriately on thel bed.  The left shoulder to be aspirated and injected was prepped and draped in a sterile fashion.  The skin was anesthetized with ethyl chloride spray.  A joint aspiration was to be performed an 18-gauge needle was used otherwise a 22-gauge needle was used to inject the appropriate joint.    Joint injection was performed with a mixture of 5 cc 1% lidocaine plain and 2 cc Celestone Soluspan 6 mg per mL.  The needle was removed and the puncture site closed and sealed with a Band-Aid.  The patient tolerated the procedure well.    Right elbow exam    The affected right elbow was examined and inspected and was found to be tender along the lateral border of the elbow.  There was pain with resisted extension and flexion.    The skin was intact without open wound or breakdown.  There was apprehension with resisted stressing.  There was no gross evidence of instability.  There was near full extension with flexion to 160 degrees.  There was a negative Tinel's.  There was intact flexor and extensor tendons.  There was no wrist drop numbness or tingling.    Sensation was abnormal in the ulnar-sided digits however the reflexes down into the hand were preserved.  There was a mild effusion.    The patient had limited ability to grasp due to pain and discomfort.  Shoulder appeared to be uninvolved.    Before  injection the risks of a cortisone injection including infection, local skin irritation, skin atrophy, calcification, continued pain and discomfort, elevated blood sugar, burning, failure to relieve pain, possible late infection were discussed with the patient.    Postprocedure discomfort can be alleviated with additional medications, ice, elevation, rest over the first 24 hours as recommended.    Patient verbalized understanding and wanted to proceed with the planned procedure.    After informed consent was provided and allergies verified,  the patient was positioned appropriately on thel bed.  The joint to be  injected was prepped and draped in a sterile fashion.  The skin was anesthetized with ethyl chloride spray.  A joint injection utilized a 25-gauge needle.        Joint injection was performed with a mixture of 1 cc 1% lidocaine plain and 1 cc  Celestone Soluspan 6 mg per mL.  The needle was removed and the puncture site closed and sealed with a Band-Aid.  The patient tolerated the procedure well.      Diagnostic studies:  no new studies    Impression: Right elbow extensor tendinitis, left shoulder rotator cuff tendinitis    Plan: Injection right elbow with therapy PT rehab program right elbow    Therapy left shoulder after injection    Follow-up 5 to 6 weeks    If not significantly improved 3 views right elbow and 2 views left shoulder and up discussion of additional imaging studies    If she continues to have some numbness and paresthesias in the ulnar side digits on the right elbow EMG right upper extremity would be in order as well      M Inj/Asp: R elbow on 3/31/2025 4:43 PM  Details: lateral approach  Aspirate: sent for lab analysis  Outcome: tolerated well, no immediate complications  Immediately prior to procedure a time out was called to verify the correct patient, procedure, equipment, support staff and site/side marked as required. Patient was prepped and draped in the usual sterile fashion.       L Inj/Asp: L subacromial bursa on 3/31/2025 4:44 PM  Indications: pain  Details: 22 G needle, medial approach  Medications: 2 mL betamethasone acet,sod phos 6 mg/mL; 5 mL lidocaine 10 mg/mL (1 %)  Outcome: tolerated well, no immediate complications  Procedure, treatment alternatives, risks and benefits explained, specific risks discussed. Consent was given by the patient. Immediately prior to procedure a time out was called to verify the correct patient, procedure, equipment, support staff and site/side marked as required.

## 2025-04-14 ENCOUNTER — APPOINTMENT (OUTPATIENT)
Dept: PHYSICAL THERAPY | Facility: CLINIC | Age: 50
End: 2025-04-14
Payer: COMMERCIAL

## 2025-04-29 ENCOUNTER — APPOINTMENT (OUTPATIENT)
Dept: PHYSICAL THERAPY | Facility: CLINIC | Age: 50
End: 2025-04-29
Payer: COMMERCIAL

## 2025-05-05 ENCOUNTER — APPOINTMENT (OUTPATIENT)
Dept: PHYSICAL THERAPY | Facility: CLINIC | Age: 50
End: 2025-05-05
Payer: COMMERCIAL

## 2025-05-13 ENCOUNTER — TREATMENT (OUTPATIENT)
Dept: PHYSICAL THERAPY | Facility: CLINIC | Age: 50
End: 2025-05-13
Payer: COMMERCIAL

## 2025-05-13 DIAGNOSIS — M19.011 OSTEOARTHRITIS OF RIGHT ACROMIOCLAVICULAR JOINT: ICD-10-CM

## 2025-05-13 DIAGNOSIS — M75.101 TEAR OF RIGHT SUPRASPINATUS TENDON: ICD-10-CM

## 2025-05-13 DIAGNOSIS — S43.431D TEAR OF RIGHT GLENOID LABRUM, SUBSEQUENT ENCOUNTER: ICD-10-CM

## 2025-05-13 DIAGNOSIS — M67.813 BICEPS TENDONOSIS OF RIGHT SHOULDER: ICD-10-CM

## 2025-05-13 PROCEDURE — 97110 THERAPEUTIC EXERCISES: CPT | Mod: GP | Performed by: PHYSICAL THERAPIST

## 2025-05-13 PROCEDURE — 97140 MANUAL THERAPY 1/> REGIONS: CPT | Mod: GP | Performed by: PHYSICAL THERAPIST

## 2025-05-13 NOTE — PROGRESS NOTES
Physical Therapy Treatment    Patient Name: Lashell Alcazar  MRN: [id number]  Today's Date: 5/13/2025  Time Calculation  Start Time: 0206  Stop Time: 0303  Time Calculation (min): 57 min       PT Therapeutic Procedures Time Entry  Manual Therapy Time Entry: 30  Therapeutic Exercise Time Entry: 23                   INSURANCE:  Visit number: 2/5  [facility] BMN PT OT COPAY 40 (42) 700(42) COVERAGE 65 OOP 6000(134) NO AUTH REQ  # [id number]  [facility] [id number]/ALL     Referring Provider: Alana Fitzgerald MD  Hx: DM, OA, Prolapse, 2 vaginal deliveries     CURRENT PROBLEM:  1. Tear of right supraspinatus tendon  Follow Up In Physical Therapy      2. Tear of right glenoid labrum, subsequent encounter  Follow Up In Physical Therapy      3. Biceps tendonosis of right shoulder  Follow Up In Physical Therapy      4. Osteoarthritis of right acromioclavicular joint  Follow Up In Physical Therapy        SUBJECTIVE:   General -   Pt is doing home exercises.  Not having to go to the bathroom for urination as often, frequency is improving   Urge will still be there but able to get over it with breathing   Only going twice now at work instead of 8 times    Pain -    Pain Location/Description: lumbar and tailbone pain  Pain Today: 1-2/10  Pain Worst: 5-6/10     Precautions -    None    OBJECTIVE:     Internal Palpation Exam - Consent to Pelvic Floor Internal Exam Verbally Given, Performed using universal precautions/gloves.     Pelvic Floor External Visual Observation -   Contraction with Anal [place]: present  Bearing Down with distension around anus: reduced  Cough with Contraction: Present    External Palpation -   Perineal Body: no TTP  Superficial Transverse Perineal Muscles: (R) no TTP; (L) no TTP    Visual Observation -   Good Tissue Color: Yes  [place] of Clitoris has good movement: No    Internal Palpation -   Ischiocavernosus: (R) no TTP; (L) no TTP  Bulbocavernosus: (R) mild TTP; (L) moderate TTP  Periurethrals: (R)  moderate TTP; (L) moderate TTP  Levator Ani: (R) mild TTP; (L) moderate TTP  Obturator Internus: (R) no TTP; (L) no TTP    Treatment -   Therapeutic Exercise (42608) -  Bridges 3-ways: x10 ea  Hip IR/ER Wipers: 2 min  Modified Happy Baby (Supine/Seated): 1 min ea  Cat Cow with breath: 2 min    Manual Therapy (92755) -    Internal Pelvic Floor Assessment - Gentle TPR    OP Patient Education -   Access Code: [email or web address]  URL: [email or web address]  Date: 05/13/2025  Prepared by: Sloane Hong  Added Exercises  - Supine Bridge with Hip External Rotation  - 1-2 x daily - 1-2 sets - 10-15 reps - 1-2 sec hold  - Supine Internal Rotation Hip Bridge  - 1-2 x daily - 1-2 sets - 10-15 reps - 1-2 sec hold  - Hip Internal and External Rotation Windshield Wipers  - 1-2 x daily - 10 reps - 2-3 sec hold  - Supine Pelvic Floor Stretch  - 1-2 x daily - 1-2 min hold  - Seated Happy Baby With Trunk Flexion For Pelvic Relaxation  - 1-2 x daily - 1-2 min hold  - Cat Cow  - 1-2 x daily - 2-3 sets - 10 reps    ASSESSMENT:     Internal pelvic floor assessment was performed with patient consent and gentle TPR for reduced pain/tone and spasm. Pt was introduced to more pelvic floor stretching exercises with breath and given in HEP. Pt was able to progress bridges to performing 3-ways for improved support of pelvic floor. Pt tolerated session well and is progressing towards functional needs. Continue to progress pt within tolerance and POC.    PLAN:    Continue to progress pt within tolerance. Assess response to internal pelvic floor assessment.

## 2025-05-19 ENCOUNTER — APPOINTMENT (OUTPATIENT)
Dept: ORTHOPEDIC SURGERY | Facility: CLINIC | Age: 50
End: 2025-05-19
Payer: COMMERCIAL

## 2025-05-20 ENCOUNTER — OFFICE VISIT (OUTPATIENT)
Age: 50
End: 2025-05-20
Payer: COMMERCIAL

## 2025-05-20 VITALS
SYSTOLIC BLOOD PRESSURE: 134 MMHG | OXYGEN SATURATION: 97 % | HEART RATE: 85 BPM | DIASTOLIC BLOOD PRESSURE: 79 MMHG | BODY MASS INDEX: 31.49 KG/M2 | HEIGHT: 67 IN | WEIGHT: 200.62 LBS

## 2025-05-20 DIAGNOSIS — E11.69 TYPE 2 DIABETES MELLITUS WITH OTHER SPECIFIED COMPLICATION, WITHOUT LONG-TERM CURRENT USE OF INSULIN (HCC): ICD-10-CM

## 2025-05-20 DIAGNOSIS — E11.69 TYPE 2 DIABETES MELLITUS WITH OTHER SPECIFIED COMPLICATION, WITHOUT LONG-TERM CURRENT USE OF INSULIN (HCC): Primary | ICD-10-CM

## 2025-05-20 LAB
ANION GAP SERPL CALCULATED.3IONS-SCNC: 10 MEQ/L (ref 9–15)
BUN SERPL-MCNC: 15 MG/DL (ref 6–20)
CALCIUM SERPL-MCNC: 9.6 MG/DL (ref 8.5–9.9)
CHLORIDE SERPL-SCNC: 107 MEQ/L (ref 95–107)
CO2 SERPL-SCNC: 25 MEQ/L (ref 20–31)
CREAT SERPL-MCNC: 0.56 MG/DL (ref 0.5–0.9)
GLUCOSE SERPL-MCNC: 108 MG/DL (ref 70–99)
POTASSIUM SERPL-SCNC: 3.8 MEQ/L (ref 3.4–4.9)
SODIUM SERPL-SCNC: 142 MEQ/L (ref 135–144)

## 2025-05-20 PROCEDURE — 99213 OFFICE O/P EST LOW 20 MIN: CPT | Performed by: INTERNAL MEDICINE

## 2025-05-20 PROCEDURE — 3044F HG A1C LEVEL LT 7.0%: CPT | Performed by: INTERNAL MEDICINE

## 2025-05-20 RX ORDER — TRETINOIN 0.5 MG/G
CREAM TOPICAL NIGHTLY
COMMUNITY
Start: 2024-12-17 | End: 2025-12-17

## 2025-05-20 NOTE — PROGRESS NOTES
5/20/2025    Assessment:       Diagnosis Orders   1. Type 2 diabetes mellitus with other specified complication, without long-term current use of insulin ([facility])              PLAN:       Orders Placed This Encounter   Procedures    Hemoglobin A1C     Standing Status:   Future     Number of Occurrences:   1     Expected Date:   5/20/2025     Expiration Date:   5/20/2026    Basic Metabolic Panel     Standing Status:   Future     Number of Occurrences:   1     Expected Date:   5/20/2025     Expiration Date:   5/20/2026     Orders Placed This Encounter   Medications    Tirzepatide (MOUNJARO) 15 MG/0.5ML SOAJ pen     Sig: Once a week     Dispense:  3 mL     Refill:  5       Subjective:     Chief Complaint   Patient presents with    Diabetes     Vitals:    05/20/25 1520   BP: 134/79   Pulse: 85   SpO2: 97%   Weight: 91 kg (200 lb 9.9 oz)   Height: 1.702 m (5' 7\")     Wt Readings from Last 3 Encounters:   05/20/25 91 kg (200 lb 9.9 oz)   10/14/24 90.3 kg (199 lb)   07/01/24 90.3 kg (199 lb)     BP Readings from Last 3 Encounters:   05/20/25 134/79   10/14/24 116/74   07/01/24 111/72     Follow-up on type 2 diabetes history of obesity sleep apnea patient currently on Mounjaro 12.5 mg once a week wants to increase the dose to 15 mg    Hemoglobin A1C       Date                     Value               Ref Range           Status                05/20/2025               4.4                 4.0 - 6.0 %         Final            ----------      Diabetes  She presents for her follow-up diabetic visit. She has type 2 diabetes mellitus. Symptoms are stable. Risk factors for coronary artery disease include obesity. She is following a generally healthy diet. Her overall blood glucose range is  mg/dl. An ACE inhibitor/angiotensin II receptor blocker is being taken.     Past Medical History:   Diagnosis Date    Arthritis     knees, hands    Asthma     JESSI on CPAP     PVC (premature ventricular contraction) 2017    cardiac ablation

## 2025-05-21 LAB
ESTIMATED AVERAGE GLUCOSE: 80 MG/DL
HBA1C MFR BLD: 4.4 % (ref 4–6)

## 2025-06-03 ENCOUNTER — APPOINTMENT (OUTPATIENT)
Dept: PRIMARY CARE | Facility: CLINIC | Age: 50
End: 2025-06-03
Payer: COMMERCIAL

## 2025-06-09 ENCOUNTER — APPOINTMENT (OUTPATIENT)
Dept: PHYSICAL THERAPY | Facility: CLINIC | Age: 50
End: 2025-06-09
Payer: COMMERCIAL

## 2025-06-16 ENCOUNTER — TREATMENT (OUTPATIENT)
Dept: PHYSICAL THERAPY | Facility: CLINIC | Age: 50
End: 2025-06-16
Payer: COMMERCIAL

## 2025-06-16 DIAGNOSIS — N32.81 OAB (OVERACTIVE BLADDER): Primary | ICD-10-CM

## 2025-06-16 DIAGNOSIS — N39.3 SUI (STRESS URINARY INCONTINENCE, FEMALE): ICD-10-CM

## 2025-06-16 DIAGNOSIS — M75.101 TEAR OF RIGHT SUPRASPINATUS TENDON: ICD-10-CM

## 2025-06-16 PROCEDURE — 97140 MANUAL THERAPY 1/> REGIONS: CPT | Mod: GP | Performed by: PHYSICAL THERAPIST

## 2025-06-16 PROCEDURE — 97112 NEUROMUSCULAR REEDUCATION: CPT | Mod: GP | Performed by: PHYSICAL THERAPIST

## 2025-06-16 NOTE — PROGRESS NOTES
Physical Therapy Treatment    Patient Name: Lashell Alcazar  MRN: [id number]  Today's Date: 6/16/2025  Time Calculation  Start Time: 0530  Stop Time: 0635  Time Calculation (min): 65 min       PT Therapeutic Procedures Time Entry  Neuromuscular Re-Education Time Entry: 30  Manual Therapy Time Entry: 23                   INSURANCE:  Visit number: 3/5  [facility] BMN PT OT COPAY 40 (42) 700(42) COVERAGE 65 OOP 6000(134) NO AUTH REQ  # [id number]  [facility] [id number]/ALL     Referring Provider: Alana Fitzgerald MD  Hx: DM, OA, Prolapse, 2 vaginal deliveries     CURRENT PROBLEM:  1. OAB (overactive bladder)        2. Tear of right supraspinatus tendon  Follow Up In Physical Therapy      3. JOCELYNE (stress urinary incontinence, female)          SUBJECTIVE:   General -   Pt is doing home exercises.  Frequency and urgency is a lot better    Leaking still occurring   Cough sneeze, STSs    Pain -    No Pain    Precautions -    None    OBJECTIVE:     Internal Palpation Exam - Consent to Pelvic Floor Internal Exam Verbally Given, Performed using universal precautions/gloves. Patient was asked if pt would like a Chaperone During Intimate Examination and pt declined. Documented in screenings.      Ischiocavernosus: (R) no TTP; (L) no TTP  Bulbocavernosus: (R) no TTP; (L) no TTP  Periurethrals: (R) mild TTP; (L) mild TTP (11, 1, 8-7, 4-5)  Levator Ani: (R) mild TTP; (L) mild TTP  Obturator Internus: (R) mild TTP; (L) no TTP    Pelvic Floor Strength - P / E / R // F   Power MVC: 3 /5 (0 = Zero/None, 1 = Trace, 2 = Poor/Asymmetrical, 3 = Fair/ Slight Lift, 4 = Good/Lift, 5 = Strong/Pull)  Endurance MVC (up to 10 sec): 3 sec  Repetitions of Endurance MVC with 4 second break: 2  Fast Twitch (up to 10 reps): 3    Treatment -     Manual Therapy (51921) -    Internal Pelvic Floor Assessment - Gentle TPR    Neuro Reeducation (75444) -    BioFeedback - Prometheus Pathway MR-20 (with [employer])  Position: supine with bolster  under knees  Baseline: 1.4  5 sec Work, 10 sec Rest (20 times): Av Rise 6.7, Av Rest 2.2  2 sec Work, 4 sec Rest (20 times): Av Rise 6.4, Av Rest 1.8  5 sec Work, 10 sec Rest (20 times): Av Rise 6.8, Av Rest 1.0    OP Patient Education -   Access Code: [email or web address]  URL: [email or web address]  Date: 06/16/2025  Prepared by: Sloane Hong  Added Exercises  - Seated Pelvic Floor Elevators With Lengthening  - 1-2 x daily - 15 reps    ASSESSMENT:     Internal pelvic floor assessment was performed with patient consent and gentle TPR for reduced pain/tone and spasm. Pt was introduced to biofeedback with good tolerance. Pt required cueing for full sustained holds during work phases and for full relaxation during rest periods. Pt demonstrated decreased reaction times during shorter holds. Pt was taught elevators on baseline function and given in HEP for home; pt had more difficulty with eccentric lowering. Pt tolerated session well and is progressing towards functional needs. Continue to progress pt within tolerance and POC.    PLAN:    Continue to progress pt within tolerance. Assess response to biofeedback. Continue with internal pelvic floor assessment.

## 2025-06-19 DIAGNOSIS — Z13.29 SCREENING FOR THYROID DISORDER: ICD-10-CM

## 2025-06-19 DIAGNOSIS — E53.8 B12 DEFICIENCY: ICD-10-CM

## 2025-06-19 DIAGNOSIS — I10 BENIGN ESSENTIAL HYPERTENSION: ICD-10-CM

## 2025-06-19 DIAGNOSIS — K21.9 GASTROESOPHAGEAL REFLUX DISEASE WITHOUT ESOPHAGITIS: ICD-10-CM

## 2025-06-19 DIAGNOSIS — M06.9 RHEUMATOID ARTHRITIS, INVOLVING UNSPECIFIED SITE, UNSPECIFIED WHETHER RHEUMATOID FACTOR PRESENT (MULTI): ICD-10-CM

## 2025-06-19 DIAGNOSIS — E55.9 VITAMIN D INSUFFICIENCY: Primary | ICD-10-CM

## 2025-06-19 DIAGNOSIS — E78.5 HYPERLIPIDEMIA, UNSPECIFIED HYPERLIPIDEMIA TYPE: ICD-10-CM

## 2025-06-19 DIAGNOSIS — D64.9 ANEMIA, UNSPECIFIED TYPE: ICD-10-CM

## 2025-06-19 DIAGNOSIS — R00.2 PALPITATIONS: ICD-10-CM

## 2025-06-19 DIAGNOSIS — E11.9 TYPE 2 DIABETES MELLITUS WITHOUT COMPLICATION, WITHOUT LONG-TERM CURRENT USE OF INSULIN: Primary | ICD-10-CM

## 2025-06-19 DIAGNOSIS — E11.9 TYPE 2 DIABETES MELLITUS WITHOUT COMPLICATION, WITHOUT LONG-TERM CURRENT USE OF INSULIN: ICD-10-CM

## 2025-06-19 RX ORDER — IBUPROFEN 600 MG/1
600 TABLET, FILM COATED ORAL EVERY 6 HOURS PRN
Qty: 120 TABLET | Refills: 0 | Status: SHIPPED | OUTPATIENT
Start: 2025-06-19 | End: 2025-07-19

## 2025-06-19 RX ORDER — ROSUVASTATIN CALCIUM 20 MG/1
20 TABLET, COATED ORAL DAILY
Qty: 90 TABLET | Refills: 3 | Status: SHIPPED | OUTPATIENT
Start: 2025-06-19

## 2025-06-19 NOTE — TELEPHONE ENCOUNTER
Patient requests prescription below    Last Office Visit: 9/30/2024   Next Office Visit: 7/8/2025     Requested Prescriptions     Pending Prescriptions Disp Refills    rosuvastatin (Crestor) 20 mg tablet [Pharmacy Med Name: ROSUVASTATIN CALCIUM 20 MG TAB] 90 tablet 3     Sig: TAKE 1 TABLET BY MOUTH EVERY DAY    ibuprofen 800 mg tablet [Pharmacy Med Name: IBUPROFEN 800 MG TABLET] 360 tablet 3     Sig: TAKE 1 TABLET BY MOUTH EVERY 6 HOURS

## 2025-07-01 LAB
25(OH)D3+25(OH)D2 SERPL-MCNC: 44 NG/ML (ref 30–100)
ALBUMIN SERPL-MCNC: 4.7 G/DL (ref 3.6–5.1)
ALP SERPL-CCNC: 40 U/L (ref 31–125)
ALT SERPL-CCNC: 18 U/L (ref 6–29)
ANION GAP SERPL CALCULATED.4IONS-SCNC: 9 MMOL/L (CALC) (ref 7–17)
AST SERPL-CCNC: 14 U/L (ref 10–35)
BILIRUB SERPL-MCNC: 0.5 MG/DL (ref 0.2–1.2)
BUN SERPL-MCNC: 14 MG/DL (ref 7–25)
CALCIUM SERPL-MCNC: 9.6 MG/DL (ref 8.6–10.2)
CHLORIDE SERPL-SCNC: 108 MMOL/L (ref 98–110)
CHOLEST SERPL-MCNC: 157 MG/DL
CHOLEST/HDLC SERPL: 3.6 (CALC)
CO2 SERPL-SCNC: 27 MMOL/L (ref 20–32)
CREAT SERPL-MCNC: 0.57 MG/DL (ref 0.5–0.99)
EGFRCR SERPLBLD CKD-EPI 2021: 111 ML/MIN/1.73M2
ERYTHROCYTE [DISTWIDTH] IN BLOOD BY AUTOMATED COUNT: 12.5 % (ref 11–15)
GLUCOSE SERPL-MCNC: 85 MG/DL (ref 65–99)
HCT VFR BLD AUTO: 38.7 % (ref 35–45)
HDLC SERPL-MCNC: 44 MG/DL
HGB BLD-MCNC: 12.9 G/DL (ref 11.7–15.5)
LDLC SERPL CALC-MCNC: 89 MG/DL (CALC)
MCH RBC QN AUTO: 31.2 PG (ref 27–33)
MCHC RBC AUTO-ENTMCNC: 33.3 G/DL (ref 32–36)
MCV RBC AUTO: 93.7 FL (ref 80–100)
NONHDLC SERPL-MCNC: 113 MG/DL (CALC)
PLATELET # BLD AUTO: 411 THOUSAND/UL (ref 140–400)
PMV BLD REES-ECKER: 9.5 FL (ref 7.5–12.5)
POTASSIUM SERPL-SCNC: 4 MMOL/L (ref 3.5–5.3)
PROT SERPL-MCNC: 7.1 G/DL (ref 6.1–8.1)
RBC # BLD AUTO: 4.13 MILLION/UL (ref 3.8–5.1)
SODIUM SERPL-SCNC: 144 MMOL/L (ref 135–146)
TRIGL SERPL-MCNC: 143 MG/DL
TSH SERPL-ACNC: 1.45 MIU/L
VIT B12 SERPL-MCNC: 858 PG/ML (ref 200–1100)
WBC # BLD AUTO: 6.6 THOUSAND/UL (ref 3.8–10.8)

## 2025-07-02 PROBLEM — D75.839 THROMBOCYTOSIS: Status: ACTIVE | Noted: 2025-07-02

## 2025-07-02 PROBLEM — D64.9 ANEMIA: Status: RESOLVED | Noted: 2023-10-20 | Resolved: 2025-07-02

## 2025-07-02 LAB
ALBUMIN/CREAT UR: 8 MG/G CREAT
CREAT UR-MCNC: 213 MG/DL (ref 20–275)
MICROALBUMIN UR-MCNC: 1.7 MG/DL

## 2025-07-07 ENCOUNTER — TELEPHONE (OUTPATIENT)
Dept: PRIMARY CARE | Facility: CLINIC | Age: 50
End: 2025-07-07
Payer: COMMERCIAL

## 2025-07-07 NOTE — TELEPHONE ENCOUNTER
Patient had an appointment with you scheduled for 07/08/2025, that had to be rescheduled per your request.     She is leaving for vacation on Saturday, 7/12/25 and will be returning 7/20/25. Her flight is 6 hours one way. She would like to know if a RX for Ativan can be sent in to help during the flight.   She notes she would only need 4 total, 2 for each way.     Please advise.

## 2025-07-08 ENCOUNTER — APPOINTMENT (OUTPATIENT)
Dept: PRIMARY CARE | Facility: CLINIC | Age: 50
End: 2025-07-08
Payer: COMMERCIAL

## 2025-07-11 DIAGNOSIS — F40.243 FEAR OF FLYING: Primary | ICD-10-CM

## 2025-07-11 RX ORDER — LORAZEPAM 0.5 MG/1
.5-1 TABLET ORAL SEE ADMIN INSTRUCTIONS
Qty: 4 TABLET | Refills: 0 | Status: SHIPPED | OUTPATIENT
Start: 2025-07-11 | End: 2025-08-10

## 2025-07-20 DIAGNOSIS — M06.9 RHEUMATOID ARTHRITIS, INVOLVING UNSPECIFIED SITE, UNSPECIFIED WHETHER RHEUMATOID FACTOR PRESENT (MULTI): ICD-10-CM

## 2025-07-21 RX ORDER — IBUPROFEN 600 MG/1
TABLET, FILM COATED ORAL
Qty: 120 TABLET | Refills: 0 | Status: SHIPPED | OUTPATIENT
Start: 2025-07-21

## 2025-07-21 NOTE — TELEPHONE ENCOUNTER
Pharmacy requests prescription below    Last Office Visit: 9/30/2024   Next Office Visit: Visit date not found     Requested Prescriptions     Pending Prescriptions Disp Refills    ibuprofen 600 mg tablet [Pharmacy Med Name: IBUPROFEN 600 MG TABLET] 120 tablet 0     Sig: TAKE 1 TABLET BY MOUTH EVERY 6 HOURS IF NEEDED FOR MILD PAIN (1 - 3).

## 2025-08-19 DIAGNOSIS — I10 BENIGN ESSENTIAL HYPERTENSION: ICD-10-CM

## 2025-08-22 RX ORDER — LOSARTAN POTASSIUM 25 MG/1
25 TABLET ORAL DAILY
Qty: 90 TABLET | Refills: 0 | Status: SHIPPED | OUTPATIENT
Start: 2025-08-22

## 2025-08-24 DIAGNOSIS — M06.9 RHEUMATOID ARTHRITIS, INVOLVING UNSPECIFIED SITE, UNSPECIFIED WHETHER RHEUMATOID FACTOR PRESENT (MULTI): ICD-10-CM

## 2025-08-26 RX ORDER — IBUPROFEN 600 MG/1
TABLET, FILM COATED ORAL
Qty: 120 TABLET | Refills: 0 | Status: SHIPPED | OUTPATIENT
Start: 2025-08-26

## 2025-09-16 ENCOUNTER — APPOINTMENT (OUTPATIENT)
Dept: PRIMARY CARE | Facility: CLINIC | Age: 50
End: 2025-09-16
Payer: COMMERCIAL

## (undated) DEVICE — DRESSING PETRO W7.6XL7.6CM CELOS ACETT NONADHERING MESH

## (undated) DEVICE — X-RAY DETECTABLE SPONGES,16 PLY: Brand: VISTEC

## (undated) DEVICE — SKIN MARKER,REGULAR TIP WITH RULER: Brand: DEVON

## (undated) DEVICE — SLING ARM M L15 1/2IN D8IN COT POLY DLX W/ SHLDR PD

## (undated) DEVICE — TOPAZ ICW: Brand: COBLATION

## (undated) DEVICE — CHLORAPREP 26ML ORANGE

## (undated) DEVICE — BANDAGE,GAUZE,BULKEE II,4.5"X4.1YD,STRL: Brand: MEDLINE

## (undated) DEVICE — INTENDED FOR TISSUE SEPARATION, AND OTHER PROCEDURES THAT REQUIRE A SHARP SURGICAL BLADE TO PUNCTURE OR CUT.: Brand: BARD-PARKER ® CARBON RIB-BACK BLADES

## (undated) DEVICE — GLOVE SURG SZ 7 L12IN FNGR THK94MIL TRNSLUC YEL LTX HYDRGEL

## (undated) DEVICE — SHEET,DRAPE,53X77,STERILE: Brand: MEDLINE

## (undated) DEVICE — BANDAGE COMPR W4INXL5FT E SGL LAYERED CLP CLSR PREM CONTEX

## (undated) DEVICE — KIT SURG OINT ST WET UNIV PREP BDINE

## (undated) DEVICE — COVER LT HNDL BLU PLAS